# Patient Record
Sex: FEMALE | Race: WHITE | HISPANIC OR LATINO | Employment: FULL TIME | URBAN - METROPOLITAN AREA
[De-identification: names, ages, dates, MRNs, and addresses within clinical notes are randomized per-mention and may not be internally consistent; named-entity substitution may affect disease eponyms.]

---
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
## 2018-01-05 ENCOUNTER — NON-PROVIDER VISIT (OUTPATIENT)
Dept: OCCUPATIONAL MEDICINE | Facility: CLINIC | Age: 18
End: 2018-01-05

## 2018-01-05 DIAGNOSIS — Z02.1 PRE-EMPLOYMENT DRUG SCREENING: ICD-10-CM

## 2018-01-05 DIAGNOSIS — Z02.1 DRUG TESTING, PRE-EMPLOYMENT: ICD-10-CM

## 2018-01-05 LAB
AMP AMPHETAMINE: NORMAL
COC COCAINE: NORMAL
INT CON NEG: NORMAL
INT CON POS: NORMAL
MET METHAMPHETAMINES: NORMAL
OPI OPIATES: NORMAL
PCP PHENCYCLIDINE: NORMAL
POC DRUG COMMENT 753798-OCCUPATIONAL HEALTH: NEGATIVE
THC: NORMAL

## 2018-01-05 PROCEDURE — 80305 DRUG TEST PRSMV DIR OPT OBS: CPT | Performed by: PREVENTIVE MEDICINE

## 2018-12-14 ENCOUNTER — HOSPITAL ENCOUNTER (OUTPATIENT)
Facility: MEDICAL CENTER | Age: 18
End: 2018-12-14
Attending: OBSTETRICS & GYNECOLOGY | Admitting: OBSTETRICS & GYNECOLOGY
Payer: MEDICAID

## 2018-12-14 VITALS — HEART RATE: 73 BPM | SYSTOLIC BLOOD PRESSURE: 99 MMHG | DIASTOLIC BLOOD PRESSURE: 57 MMHG

## 2018-12-14 LAB
APPEARANCE UR: ABNORMAL
COLOR UR AUTO: ABNORMAL
GLUCOSE UR QL STRIP.AUTO: NEGATIVE MG/DL
KETONES UR QL STRIP.AUTO: NEGATIVE MG/DL
LEUKOCYTE ESTERASE UR QL STRIP.AUTO: NEGATIVE
NITRITE UR QL STRIP.AUTO: NEGATIVE
PH UR STRIP.AUTO: 5.5 [PH]
PROT UR QL STRIP: NEGATIVE MG/DL
RBC UR QL AUTO: NEGATIVE
SP GR UR: 1.02

## 2018-12-14 PROCEDURE — 81002 URINALYSIS NONAUTO W/O SCOPE: CPT

## 2018-12-14 NOTE — PROGRESS NOTES
1315 Pt presents with [place] from [facility] after having increased pelvic pressure and pink tinged discharge on toilet paper after using restroom. Pt denies recent sexual intercourse.    EDC 3/14/19 EGA 27. 1     RN attempted to put baby on the monitor. Pt reports good fetal movement. Ynable to find FHT with 250 US machine. Attempted to auscultate with doppler. Unable to find HR. Pulse ox applied to pt, maternal pulse in 60's. Auscultating in 60's. Charge RN Maldonado Salas called to bedside. US brought in. Dr. Becerra in department. Baby transverse, back up, FHT in high  upper left quadrent. 's. [place] applied, No UC's on monitor or palpated. T97.7, BP 99/57 73, SVE by Dr. Becerra, closed/thick/firm.   Clean catch urine dip, guillaume SG 1.025, all other values WNL. Report to Dr. Becerra, discharge order received. Pt to go home and rest and f/u with Dr. Escalante for her next scheduled appointment.   1415 Discharged to home, ambulatory.

## 2018-12-15 NOTE — CONSULTS
DATE OF SERVICE:  12/14/2018    OBSTETRICAL TRIAGE VISIT NOTE    DATE OF SERVICE:  12/14/2018    IDENTIFICATION:  This is a 18-year-old G2, P1, EDC 03/14/2019, which makes her   approximately 27 weeks' gestation.    HISTORY OF PRESENT ILLNESS:  The patient is a patient of Dr. Escalante, who has   received prenatal care since approximately 9 weeks gestation.  Pregnancy was   complicated by diagnosis of chlamydia at her first visit.  She had a negative   test of cure in September and 11/29.  She declined genetic testing and she is   Rh positive.  She presented today complaining of increasing pressure and a   small amount of pink spotting.  She was working out at [employer] today,   she has been on her feet for a long time and when she noted these symptoms and   was brought in by [facility].  Previous pregnancy, delivered at full term.  She   has no other medical history.  No other complaints.  She denies fevers or   leaking of fluid.  She reports that the baby has been moving well.    PHYSICAL EXAMINATION:  VITAL SIGNS:  She is afebrile, blood pressure was normotensive.  GENERAL:  She is pleasant, cooperative and appears her stated age.  ABDOMEN:  Soft.  GENITOURINARY:  Uterus is nontender.  Fetal heart tones initially could not be   found in all, I did a brief bedside ultrasound, baby was in transverse   position back up with the baby's head on the right side and fetal heart tones   were confirmed.  As of right now, she has a reactive fetal heart tracing that   appears to be appropriate for gestational age.  We are not picking up   contractions.  I checked her cervix, it is closed and firm.  I did not see any   blood on my glove.     PLAN:  Plan will be to send off her urinalysis to p.o., hydrate.  Assuming she   improves, we will send her home for followup with Dr. Escalante next week.       ____________________________________     MD STEFFEN Douglas / CARLINE    DD:  12/14/2018 13:43:59  DT:  12/14/2018 15:31:15    D#:   [id number]  Job#:  [id number]

## 2019-01-13 ENCOUNTER — HOSPITAL ENCOUNTER (OUTPATIENT)
Facility: MEDICAL CENTER | Age: 19
End: 2019-01-13
Attending: OBSTETRICS & GYNECOLOGY | Admitting: OBSTETRICS & GYNECOLOGY
Payer: MEDICAID

## 2019-01-13 VITALS
HEART RATE: 95 BPM | WEIGHT: 125 LBS | RESPIRATION RATE: 18 BRPM | HEIGHT: 61 IN | TEMPERATURE: 97.3 F | BODY MASS INDEX: 23.6 KG/M2 | SYSTOLIC BLOOD PRESSURE: 118 MMHG | DIASTOLIC BLOOD PRESSURE: 67 MMHG

## 2019-01-13 LAB
APPEARANCE UR: ABNORMAL
COLOR UR AUTO: YELLOW
FIBRONECTIN FETAL SPEC QL: NEGATIVE
GLUCOSE UR QL STRIP.AUTO: NEGATIVE MG/DL
KETONES UR QL STRIP.AUTO: 80 MG/DL
LEUKOCYTE ESTERASE UR QL STRIP.AUTO: ABNORMAL
NITRITE UR QL STRIP.AUTO: NEGATIVE
PH UR STRIP.AUTO: 7 [PH]
PROT UR QL STRIP: NEGATIVE MG/DL
RBC UR QL AUTO: NEGATIVE
SP GR UR: 1.02

## 2019-01-13 PROCEDURE — 700111 HCHG RX REV CODE 636 W/ 250 OVERRIDE (IP): Performed by: OBSTETRICS & GYNECOLOGY

## 2019-01-13 PROCEDURE — 82731 ASSAY OF FETAL FIBRONECTIN: CPT

## 2019-01-13 PROCEDURE — 96372 THER/PROPH/DIAG INJ SC/IM: CPT

## 2019-01-13 PROCEDURE — 59025 FETAL NON-STRESS TEST: CPT

## 2019-01-13 PROCEDURE — 81002 URINALYSIS NONAUTO W/O SCOPE: CPT

## 2019-01-13 RX ORDER — TERBUTALINE SULFATE 1 MG/ML
0.25 INJECTION, SOLUTION SUBCUTANEOUS ONCE
Status: COMPLETED | OUTPATIENT
Start: 2019-01-13 | End: 2019-01-13

## 2019-01-13 RX ORDER — TERBUTALINE SULFATE 1 MG/ML
INJECTION, SOLUTION SUBCUTANEOUS
Status: DISCONTINUED
Start: 2019-01-13 | End: 2019-01-13 | Stop reason: HOSPADM

## 2019-01-13 RX ADMIN — TERBUTALINE SULFATE 0.25 MG: 1 INJECTION, SOLUTION SUBCUTANEOUS at 18:43

## 2019-01-14 NOTE — H&P
DATE OF SERVICE:  2019    IDENTIFICATION:  This is a 18-year-old  2, para 1-0-0-1 with an EDC of   2019, EGA of 31 and 3/7 weeks who presents with a chief complaint of   uterine contractions.    HISTORY OF PRESENT ILLNESS:  This is a patient of Dr. Escalante.  She has gotten   good prenatal care.  She states her care has been uncomplicated.  She presents   today complaining of uterine contractions since 4:30 this afternoon.  She   denies spontaneous rupture of membranes or vaginal bleeding.  Admits to good   fetal movement.  Denies symptoms of PIH, headaches, visual changes or   epigastric pain.  She has no other complaints, nausea, vomiting, fever,   chills, change in bowel or bladder habits.  Fetal fibronectin and urinalysis   are negative.  Fetal heart tracings reactive, category 1.  She is marques   every 4-6 now.  Cervix is fingertip, thick, high, posterior per the nurse.    OBSTETRIC HISTORY:  Significant for previous vaginal delivery.    GYNECOLOGIC HISTORY:  History of chlamydia with treatment.    ALLERGIES:  None.    CURRENT MEDICATIONS:  Prenatal vitamins.    MEDICAL PROBLEMS:  None.    PAST SURGICAL HISTORY:  None.    SOCIAL HISTORY:  Denies alcohol, tobacco or drug use.    FAMILY HISTORY:  Noncontributory.    REVIEW OF SYSTEMS:  Review of systems x12 is negative per AMA standards   available in chart.    LABORATORY DATA:  FFN and urinalysis pending.    PHYSICAL EXAMINATION:  VITAL SIGNS:  Patient is afebrile.  Vital signs within normal limits.  Fetal   heart tracings reactive, category 1.  She is marques every 4-6, previously   more frequently.  GENERAL:  She is awake, alert, in no apparent distress.  NECK:  Supple.  HEART:  Regular.  CHEST:  Clear.  ABDOMEN:  Soft, gravid, size appropriate for dates.  EXTREMITIES:  Negative.  GYNECOLOGIC:  As above.    ASSESSMENT:  At this time is pregnancy at 31 and 3/7 weeks with    uterine contractions, rule out  labor.    PLAN:   At this time, we will do a urinalysis, check a FFN, hydrate, give her a   shot terb.  If all is well, we will send her home if she no signs or symptoms   of  labor.  Would follow up with Dr. Escalante this week.       ____________________________________     MD NORAH White / CARLINE    DD:  2019 18:41:37  DT:  2019 19:14:26    D#:  [id number]  Job#:  [id number]

## 2019-01-14 NOTE — PROGRESS NOTES
1900: report received from juanjo deleon rn, assumed care of patient.      1920: pt denies uc's after terbutaline.  urine sample obtained, +ketones and high specific gravity.  Pt given water, juice, and crackers.  Pt states she hasn't eaten much today. Pt educated    1955: dr. tomlinson in department. ffn negative.  Orders to discharge home.  Pt given pre term labor precautions and instructed to follow up with dr. tello this week.  Pt denies uc's.      2005: pt discharged in stable condition.

## 2019-01-14 NOTE — PROGRESS NOTES
1800 - Patient of of Dr. Escalante presents with c/o contractions. [place]t Gestation today at 31.3 weeks    Reports sudden onset of uterine contractions low in her abdomen wrapping to low back at 1630.  Denies problems with Pregnancy, denies ROM or Bleeding. Denies change to vision/edema/HA, Reports FM.  FM/TOCO use discussed and placed. POC discussed. Patient is unable to provide a urine sample, pitcher of ice/water encouraged/available at BS. Patient encouraged to call RN with all questions concerns needs prn.    Report to Dr. Deleon regarding patient arrival/complaint/status, order for FFN and SVE.   1815 - FFN collected, SVE at ft/thick/-3 no blood or fluid noted on exam glove.  1830 - Dr. Deleon at BS assessing complaint and discussing POC. Order received for terbutalline and continued oral hydration. Patient is still unable to offer a urine sample for ua.     1900 - Report to [place] CUNNINGHAM RN

## 2019-03-11 ENCOUNTER — HOSPITAL ENCOUNTER (INPATIENT)
Facility: MEDICAL CENTER | Age: 19
LOS: 2 days | End: 2019-03-13
Attending: OBSTETRICS & GYNECOLOGY | Admitting: OBSTETRICS & GYNECOLOGY
Payer: MEDICAID

## 2019-03-11 ENCOUNTER — APPOINTMENT (OUTPATIENT)
Dept: OBGYN | Facility: MEDICAL CENTER | Age: 19
End: 2019-03-11
Attending: OBSTETRICS & GYNECOLOGY
Payer: MEDICAID

## 2019-03-11 DIAGNOSIS — R10.2 PELVIC AND PERINEAL PAIN: ICD-10-CM

## 2019-03-11 LAB
BASOPHILS # BLD AUTO: 0.4 % (ref 0–1.8)
BASOPHILS # BLD: 0.03 K/UL (ref 0–0.12)
EOSINOPHIL # BLD AUTO: 0.1 K/UL (ref 0–0.51)
EOSINOPHIL NFR BLD: 1.3 % (ref 0–6.9)
ERYTHROCYTE [DISTWIDTH] IN BLOOD BY AUTOMATED COUNT: 41.2 FL (ref 35.9–50)
HCT VFR BLD AUTO: 34.7 % (ref 37–47)
HGB BLD-MCNC: 11.4 G/DL (ref 12–16)
HOLDING TUBE BB 8507: NORMAL
IMM GRANULOCYTES # BLD AUTO: 0.05 K/UL (ref 0–0.11)
IMM GRANULOCYTES NFR BLD AUTO: 0.6 % (ref 0–0.9)
LYMPHOCYTES # BLD AUTO: 2.54 K/UL (ref 1–4.8)
LYMPHOCYTES NFR BLD: 32.5 % (ref 22–41)
MCH RBC QN AUTO: 28.5 PG (ref 27–33)
MCHC RBC AUTO-ENTMCNC: 32.9 G/DL (ref 33.6–35)
MCV RBC AUTO: 86.8 FL (ref 81.4–97.8)
MONOCYTES # BLD AUTO: 0.43 K/UL (ref 0–0.85)
MONOCYTES NFR BLD AUTO: 5.5 % (ref 0–13.4)
NEUTROPHILS # BLD AUTO: 4.67 K/UL (ref 2–7.15)
NEUTROPHILS NFR BLD: 59.7 % (ref 44–72)
NRBC # BLD AUTO: 0 K/UL
NRBC BLD-RTO: 0 /100 WBC
PLATELET # BLD AUTO: 196 K/UL (ref 164–446)
PMV BLD AUTO: 9.6 FL (ref 9–12.9)
RBC # BLD AUTO: 4 M/UL (ref 4.2–5.4)
WBC # BLD AUTO: 7.8 K/UL (ref 4.8–10.8)

## 2019-03-11 PROCEDURE — 700102 HCHG RX REV CODE 250 W/ 637 OVERRIDE(OP): Performed by: OBSTETRICS & GYNECOLOGY

## 2019-03-11 PROCEDURE — 700112 HCHG RX REV CODE 229: Performed by: OBSTETRICS & GYNECOLOGY

## 2019-03-11 PROCEDURE — 85025 COMPLETE CBC W/AUTO DIFF WBC: CPT

## 2019-03-11 PROCEDURE — 700101 HCHG RX REV CODE 250

## 2019-03-11 PROCEDURE — 303615 HCHG EPIDURAL/SPINAL ANESTHESIA FOR LABOR

## 2019-03-11 PROCEDURE — 700105 HCHG RX REV CODE 258: Performed by: OBSTETRICS & GYNECOLOGY

## 2019-03-11 PROCEDURE — 770002 HCHG ROOM/CARE - OB PRIVATE (112)

## 2019-03-11 PROCEDURE — 700111 HCHG RX REV CODE 636 W/ 250 OVERRIDE (IP): Performed by: OBSTETRICS & GYNECOLOGY

## 2019-03-11 PROCEDURE — 304965 HCHG RECOVERY SERVICES

## 2019-03-11 PROCEDURE — 36415 COLL VENOUS BLD VENIPUNCTURE: CPT

## 2019-03-11 PROCEDURE — 10907ZC DRAINAGE OF AMNIOTIC FLUID, THERAPEUTIC FROM PRODUCTS OF CONCEPTION, VIA NATURAL OR ARTIFICIAL OPENING: ICD-10-PCS | Performed by: OBSTETRICS & GYNECOLOGY

## 2019-03-11 PROCEDURE — 3E033VJ INTRODUCTION OF OTHER HORMONE INTO PERIPHERAL VEIN, PERCUTANEOUS APPROACH: ICD-10-PCS | Performed by: OBSTETRICS & GYNECOLOGY

## 2019-03-11 PROCEDURE — 700111 HCHG RX REV CODE 636 W/ 250 OVERRIDE (IP)

## 2019-03-11 PROCEDURE — A9270 NON-COVERED ITEM OR SERVICE: HCPCS | Performed by: OBSTETRICS & GYNECOLOGY

## 2019-03-11 PROCEDURE — 59409 OBSTETRICAL CARE: CPT

## 2019-03-11 RX ORDER — CITRIC ACID/SODIUM CITRATE 334-500MG
30 SOLUTION, ORAL ORAL EVERY 6 HOURS PRN
Status: DISCONTINUED | OUTPATIENT
Start: 2019-03-11 | End: 2019-03-11 | Stop reason: HOSPADM

## 2019-03-11 RX ORDER — ROPIVACAINE HYDROCHLORIDE 2 MG/ML
INJECTION, SOLUTION EPIDURAL; INFILTRATION; PERINEURAL CONTINUOUS
Status: DISCONTINUED | OUTPATIENT
Start: 2019-03-11 | End: 2019-03-13 | Stop reason: HOSPADM

## 2019-03-11 RX ORDER — ONDANSETRON 2 MG/ML
4 INJECTION INTRAMUSCULAR; INTRAVENOUS EVERY 6 HOURS PRN
Status: DISCONTINUED | OUTPATIENT
Start: 2019-03-11 | End: 2019-03-13 | Stop reason: HOSPADM

## 2019-03-11 RX ORDER — DEXTROSE, SODIUM CHLORIDE, SODIUM LACTATE, POTASSIUM CHLORIDE, AND CALCIUM CHLORIDE 5; .6; .31; .03; .02 G/100ML; G/100ML; G/100ML; G/100ML; G/100ML
INJECTION, SOLUTION INTRAVENOUS CONTINUOUS
Status: DISCONTINUED | OUTPATIENT
Start: 2019-03-11 | End: 2019-03-13 | Stop reason: HOSPADM

## 2019-03-11 RX ORDER — ACETAMINOPHEN 325 MG/1
325 TABLET ORAL EVERY 4 HOURS PRN
Status: DISCONTINUED | OUTPATIENT
Start: 2019-03-11 | End: 2019-03-13 | Stop reason: HOSPADM

## 2019-03-11 RX ORDER — MISOPROSTOL 200 UG/1
800 TABLET ORAL
Status: DISCONTINUED | OUTPATIENT
Start: 2019-03-11 | End: 2019-03-13 | Stop reason: HOSPADM

## 2019-03-11 RX ORDER — IBUPROFEN 600 MG/1
600 TABLET ORAL EVERY 6 HOURS PRN
Status: DISCONTINUED | OUTPATIENT
Start: 2019-03-11 | End: 2019-03-13 | Stop reason: HOSPADM

## 2019-03-11 RX ORDER — ROPIVACAINE HYDROCHLORIDE 2 MG/ML
INJECTION, SOLUTION EPIDURAL; INFILTRATION; PERINEURAL
Status: COMPLETED
Start: 2019-03-11 | End: 2019-03-11

## 2019-03-11 RX ORDER — SODIUM CHLORIDE, SODIUM LACTATE, POTASSIUM CHLORIDE, AND CALCIUM CHLORIDE .6; .31; .03; .02 G/100ML; G/100ML; G/100ML; G/100ML
1000 INJECTION, SOLUTION INTRAVENOUS
Status: DISCONTINUED | OUTPATIENT
Start: 2019-03-11 | End: 2019-03-11 | Stop reason: HOSPADM

## 2019-03-11 RX ORDER — SODIUM CHLORIDE, SODIUM LACTATE, POTASSIUM CHLORIDE, AND CALCIUM CHLORIDE .6; .31; .03; .02 G/100ML; G/100ML; G/100ML; G/100ML
250 INJECTION, SOLUTION INTRAVENOUS PRN
Status: DISCONTINUED | OUTPATIENT
Start: 2019-03-11 | End: 2019-03-11 | Stop reason: HOSPADM

## 2019-03-11 RX ORDER — HYDROCODONE BITARTRATE AND ACETAMINOPHEN 10; 325 MG/1; MG/1
1 TABLET ORAL EVERY 4 HOURS PRN
Status: DISCONTINUED | OUTPATIENT
Start: 2019-03-11 | End: 2019-03-13 | Stop reason: HOSPADM

## 2019-03-11 RX ORDER — BISACODYL 10 MG
10 SUPPOSITORY, RECTAL RECTAL PRN
Status: DISCONTINUED | OUTPATIENT
Start: 2019-03-11 | End: 2019-03-13 | Stop reason: HOSPADM

## 2019-03-11 RX ORDER — HYDROCODONE BITARTRATE AND ACETAMINOPHEN 5; 325 MG/1; MG/1
1 TABLET ORAL EVERY 4 HOURS PRN
Status: DISCONTINUED | OUTPATIENT
Start: 2019-03-11 | End: 2019-03-13 | Stop reason: HOSPADM

## 2019-03-11 RX ORDER — ONDANSETRON 4 MG/1
4 TABLET, ORALLY DISINTEGRATING ORAL EVERY 6 HOURS PRN
Status: DISCONTINUED | OUTPATIENT
Start: 2019-03-11 | End: 2019-03-13 | Stop reason: HOSPADM

## 2019-03-11 RX ORDER — SODIUM CHLORIDE, SODIUM LACTATE, POTASSIUM CHLORIDE, CALCIUM CHLORIDE 600; 310; 30; 20 MG/100ML; MG/100ML; MG/100ML; MG/100ML
INJECTION, SOLUTION INTRAVENOUS PRN
Status: DISCONTINUED | OUTPATIENT
Start: 2019-03-11 | End: 2019-03-13 | Stop reason: HOSPADM

## 2019-03-11 RX ORDER — SODIUM CHLORIDE, SODIUM LACTATE, POTASSIUM CHLORIDE, CALCIUM CHLORIDE 600; 310; 30; 20 MG/100ML; MG/100ML; MG/100ML; MG/100ML
INJECTION, SOLUTION INTRAVENOUS CONTINUOUS
Status: DISPENSED | OUTPATIENT
Start: 2019-03-11 | End: 2019-03-11

## 2019-03-11 RX ORDER — MISOPROSTOL 200 UG/1
800 TABLET ORAL
Status: DISCONTINUED | OUTPATIENT
Start: 2019-03-11 | End: 2019-03-11 | Stop reason: HOSPADM

## 2019-03-11 RX ORDER — DOCUSATE SODIUM 100 MG/1
100 CAPSULE, LIQUID FILLED ORAL 2 TIMES DAILY PRN
Status: DISCONTINUED | OUTPATIENT
Start: 2019-03-11 | End: 2019-03-13 | Stop reason: HOSPADM

## 2019-03-11 RX ORDER — VITAMIN A ACETATE, BETA CAROTENE, ASCORBIC ACID, CHOLECALCIFEROL, .ALPHA.-TOCOPHEROL ACETATE, DL-, THIAMINE MONONITRATE, RIBOFLAVIN, NIACINAMIDE, PYRIDOXINE HYDROCHLORIDE, FOLIC ACID, CYANOCOBALAMIN, CALCIUM CARBONATE, FERROUS FUMARATE, ZINC OXIDE, CUPRIC OXIDE 3080; 12; 120; 400; 1; 1.84; 3; 20; 22; 920; 25; 200; 27; 10; 2 [IU]/1; UG/1; MG/1; [IU]/1; MG/1; MG/1; MG/1; MG/1; MG/1; [IU]/1; MG/1; MG/1; MG/1; MG/1; MG/1
1 TABLET, FILM COATED ORAL EVERY MORNING
Status: DISCONTINUED | OUTPATIENT
Start: 2019-03-12 | End: 2019-03-13 | Stop reason: HOSPADM

## 2019-03-11 RX ADMIN — Medication 125 ML/HR: at 15:00

## 2019-03-11 RX ADMIN — SODIUM CHLORIDE, POTASSIUM CHLORIDE, SODIUM LACTATE AND CALCIUM CHLORIDE: 600; 310; 30; 20 INJECTION, SOLUTION INTRAVENOUS at 06:43

## 2019-03-11 RX ADMIN — IBUPROFEN 600 MG: 600 TABLET ORAL at 22:12

## 2019-03-11 RX ADMIN — SODIUM CHLORIDE 5 MILLION UNITS: 900 INJECTION INTRAVENOUS at 06:45

## 2019-03-11 RX ADMIN — SODIUM CHLORIDE 2.5 MILLION UNITS: 9 INJECTION, SOLUTION INTRAVENOUS at 10:33

## 2019-03-11 RX ADMIN — HYDROCODONE BITARTRATE AND ACETAMINOPHEN 1 TABLET: 5; 325 TABLET ORAL at 22:44

## 2019-03-11 RX ADMIN — Medication 2 MILLI-UNITS/MIN: at 06:55

## 2019-03-11 RX ADMIN — DOCUSATE SODIUM 100 MG: 100 CAPSULE, LIQUID FILLED ORAL at 22:12

## 2019-03-11 RX ADMIN — ROPIVACAINE HYDROCHLORIDE 100 ML: 2 INJECTION, SOLUTION EPIDURAL; INFILTRATION at 11:38

## 2019-03-11 RX ADMIN — IBUPROFEN 600 MG: 600 TABLET ORAL at 16:05

## 2019-03-11 RX ADMIN — HYDROCODONE BITARTRATE AND ACETAMINOPHEN 1 TABLET: 10; 325 TABLET ORAL at 18:45

## 2019-03-11 RX ADMIN — SODIUM CHLORIDE, POTASSIUM CHLORIDE, SODIUM LACTATE AND CALCIUM CHLORIDE: 600; 310; 30; 20 INJECTION, SOLUTION INTRAVENOUS at 11:05

## 2019-03-11 ASSESSMENT — EDINBURGH POSTNATAL DEPRESSION SCALE (EPDS)
THINGS HAVE BEEN GETTING ON TOP OF ME: NO, MOST OF THE TIME I HAVE COPED QUITE WELL
I HAVE FELT SCARED OR PANICKY FOR NO GOOD REASON: NO, NOT AT ALL
THE THOUGHT OF HARMING MYSELF HAS OCCURRED TO ME: NEVER
I HAVE BEEN SO UNHAPPY THAT I HAVE HAD DIFFICULTY SLEEPING: NOT AT ALL
I HAVE BLAMED MYSELF UNNECESSARILY WHEN THINGS WENT WRONG: NOT VERY OFTEN
I HAVE BEEN ABLE TO LAUGH AND SEE THE FUNNY SIDE OF THINGS: AS MUCH AS I ALWAYS COULD
I HAVE BEEN SO UNHAPPY THAT I HAVE BEEN CRYING: NO, NEVER
I HAVE FELT SAD OR MISERABLE: NO, NOT AT ALL
I HAVE BEEN ANXIOUS OR WORRIED FOR NO GOOD REASON: NO, NOT AT ALL
I HAVE LOOKED FORWARD WITH ENJOYMENT TO THINGS: AS MUCH AS I EVER DID

## 2019-03-11 ASSESSMENT — PATIENT HEALTH QUESTIONNAIRE - PHQ9
SUM OF ALL RESPONSES TO PHQ9 QUESTIONS 1 AND 2: 0
1. LITTLE INTEREST OR PLEASURE IN DOING THINGS: NOT AT ALL
2. FEELING DOWN, DEPRESSED, IRRITABLE, OR HOPELESS: NOT AT ALL

## 2019-03-11 ASSESSMENT — LIFESTYLE VARIABLES
ALCOHOL_USE: NO
EVER_SMOKED: NEVER

## 2019-03-11 NOTE — PROGRESS NOTES
Complete, 1+  Cat I currently, had some variables 30-45min ago  Will start pushing  Rodríguez Escalante MD, MS,  3/11/2019, 2:01 PM

## 2019-03-11 NOTE — H&P
"Obstetrics and Gynecology  Labor and Delivery History and Physical    Date of Admission: 3/11/2019      ID: 18 y.o.  with IUP at 39w4d     Primary OB: Rodríguez Escalante M.D.    Attending OB: Rodríguez Escalante M.D.    CC: elective IOL    HPI: Evelyn Priest is a 18 y.o.  at 39w4d by 8 week ultrasound, who presents for elective IOL.  Pt feeling well.  Denies LOF, VB.  +FM.  Intermittent CTX.  No other c/o.  Current pregnancy has been complicated by short interval pregnancy and chlamydia at the beginning of pregnancy.  Otherwise uncomplicated.      ROS: 10 systems reviewed and negative except as noted above.    Obstetric History    - 8/10/2017, , 40wk, male, 6lb 14oz, no complications   G2 - Current, as noted in HPI      Past Medical History  Surgical History   Anemia none      Gynecologic History  Social History   Menses had not yet returned prior to pregnancy after removal of Nexplanon  Has not has a pap smear yet.  +Hx of STIs: chlamydia at the beginning of pregnancy, GABRIEL negative Tobacco: denies  EtOH: denies  Street Drugs: denies      Medications  Allergies   Fe No Known Allergies     Family History   Hypertension, hyperlipidemia, diabetes       O: /59   Pulse 65   Temp 36.3 °C (97.3 °F) (Temporal)   Ht 1.549 m (5' 1\")   Wt 59.9 kg (132 lb)   BMI 24.94 kg/m²       Gen: NAD, AAO  Resp: unlabored  Abd: Gravid, NTTP,Cephalic by [place], No rebound or guarding  Ext: NTTP, no edema, 2+DPP  Pelvic: SVE 3/90/-2    FHT:  145/mod nargis/+accels, -decels  [place]: CTX q2-3min    Labs:   Lab Results   Component Value Date/Time    WBC 7.8 2019 06:35 AM    RBC 4.00 (L) 2019 06:35 AM    HEMOGLOBIN 11.4 (L) 2019 06:35 AM    HEMATOCRIT 34.7 (L) 2019 06:35 AM    MCV 86.8 2019 06:35 AM    RDW 41.2 2019 06:35 AM    PLATELETCT 196 2019 06:35 AM       Prenatal labs:   Lab  Result    Rh  positive    Antibody screen  negative    Rubella  Immune    HIV "  Non-reactive    RPR  Non-reactive    HBsAg  negative    Varicella  NONIMMUNE    Urine Culture  enterococcus at NOB visit, GABRIEL and 3rd tri negative    Gonorrhea/chlamydia  negative/positive chlamydia (negative GABRIEL and 3rd trimester screen)    Aneuploidy screening  declined    1h Glucose  108    GBS  POSITIVE       A/P: Evelyn Priest is a  at 39w4d by 8wk U/S who presents for elective IOL.  AVSS.  Cat I FHT.  *Admit to L&D  *IV, CBC, T&S on hold  *Elective Induction of Labor: On oxytocin.  Now s/p AROM.     - Recheck cx in 2h or as clinically indicated.   - Oxytocin per protocol.  *FWB: Reassuring, reactive, Cat I FHT.     - CEFM.  *Pain: planning to go without epidural  *GBS positive: PCN PPX  *Global: Rh+, RubImm, VarNONIMM   - Varivax PP   - Clears    Rodríguez Escalante MD, MS,  3/11/2019, 10:12 AM

## 2019-03-11 NOTE — PROGRESS NOTES
0600_ Pt here for elective induction. Pt is   CHRISTIAN 3/14/2019, making her 39.4 weeks GA.  VSS. EFM and TOCO applied.   0610_ SVE 2/60/-3  0700_ Report to SERA Castellanos RN.

## 2019-03-11 NOTE — PROGRESS NOTES
0700-Received BS report from Kevin RN, pt care assumed.  1005-AROM per Dr. Escalante-gricelda, 3/90/-2.  1030-Pt requests epidural.  1120-Dr. Werner in room to place epidural.  1205-SVE=7-8/90/0.  1310-SVE=ant lip/0-Dr. Escalante notified.  1355-SVE=Complete.  1414-Delivery of viable female-8/9 apgars.  1417-Placenta delivered-pitocin running

## 2019-03-12 LAB
ERYTHROCYTE [DISTWIDTH] IN BLOOD BY AUTOMATED COUNT: 41.3 FL (ref 35.9–50)
HCT VFR BLD AUTO: 27.7 % (ref 37–47)
HGB BLD-MCNC: 9.2 G/DL (ref 12–16)
MCH RBC QN AUTO: 29.5 PG (ref 27–33)
MCHC RBC AUTO-ENTMCNC: 34 G/DL (ref 33.6–35)
MCV RBC AUTO: 87 FL (ref 81.4–97.8)
PLATELET # BLD AUTO: 160 K/UL (ref 164–446)
PMV BLD AUTO: 9.6 FL (ref 9–12.9)
RBC # BLD AUTO: 3.08 M/UL (ref 4.2–5.4)
WBC # BLD AUTO: 11.3 K/UL (ref 4.8–10.8)

## 2019-03-12 PROCEDURE — 36415 COLL VENOUS BLD VENIPUNCTURE: CPT

## 2019-03-12 PROCEDURE — 85027 COMPLETE CBC AUTOMATED: CPT

## 2019-03-12 PROCEDURE — A9270 NON-COVERED ITEM OR SERVICE: HCPCS | Performed by: OBSTETRICS & GYNECOLOGY

## 2019-03-12 PROCEDURE — 770002 HCHG ROOM/CARE - OB PRIVATE (112)

## 2019-03-12 PROCEDURE — 700102 HCHG RX REV CODE 250 W/ 637 OVERRIDE(OP): Performed by: OBSTETRICS & GYNECOLOGY

## 2019-03-12 RX ADMIN — IBUPROFEN 600 MG: 600 TABLET ORAL at 21:57

## 2019-03-12 RX ADMIN — HYDROCODONE BITARTRATE AND ACETAMINOPHEN 1 TABLET: 5; 325 TABLET ORAL at 21:57

## 2019-03-12 RX ADMIN — Medication 1 TABLET: at 08:08

## 2019-03-12 RX ADMIN — IBUPROFEN 600 MG: 600 TABLET ORAL at 12:54

## 2019-03-12 NOTE — LACTATION NOTE
[facility] reports she has not  since baby returned from nursery. With permission, baby unwrapped and placed skin to skin with mother. Reviewed hunger cues and encouraged to call for latch assessment when hunger cues noted. Encouraged to keep skin to skin at least 2 hours.

## 2019-03-12 NOTE — CARE PLAN
Problem: Altered physiologic condition related to immediate post-delivery state and potential for bleeding/hemorrhage  Goal: Patient physiologically stable as evidenced by normal lochia, palpable uterine involution and vital signs within normal limits  Outcome: PROGRESSING AS EXPECTED  VSS. Fundus firm with scant lochia. Patient educated on when to pull emergency call light.     Problem: Alteration in comfort related to episiotomy, vaginal repair and/or after birth pains  Goal: Patient verbalizes acceptable pain level  Outcome: PROGRESSING AS EXPECTED  Patient will call when needing pain medication. States adequate pain relief with medications given.

## 2019-03-12 NOTE — PROGRESS NOTES
Obstetrics and Gynecology  Postpartum Progress Note    CC: PPD1 s/p     S: Pt feeling well.  Pain well controlled.  Gary reg diet.  Has ambulated.  Lochia mild. Voiding w/o difficulty.  +flatus/-BM.  Breastfeeding.  Pt denies CP/SOB, N/V, constipation/diarrhea, lower leg pain.      O:   Vitals:    19 1740 19 2200 19 0200 19 0600   BP: 112/71 110/74 116/69 105/61   Pulse: 72 71 (!) 56 73   Resp: 16 18 18 16   Temp: 36.8 °C (98.2 °F) 36.7 °C (98 °F) 36.6 °C (97.9 °F) 36.4 °C (97.5 °F)   TempSrc: Temporal Oral Oral Oral   SpO2: 98% 96% 96% 96%   Weight:       Height:          Temp (24hrs), Av.6 °C (97.9 °F), Min:36.4 °C (97.5 °F), Max:36.8 °C (98.2 °F)       Gen: NAD, AAO    CV: RRR    Pulm: unlabored    Abd: Soft, NT, no rebound/guarding, fundus firm at U-1.    Ext: WWP, no edema, non-tender to palpation    CBC:   3/11/2019 06:35 3/12/2019 02:14   WBC 7.8 11.3 (H)   RBC 4.00 (L) 3.08 (L)   Hemoglobin 11.4 (L) 9.2 (L)   Hematocrit 34.7 (L) 27.7 (L)   MCV 86.8 87.0   MCH 28.5 29.5   MCHC 32.9 (L) 34.0   RDW 41.2 41.3   Platelet Count 196 160 (L)       A/P: Evelyn Priest is a 18 y.o.  postpartum s/p .  AVSS.  Recovering well.    - Continue routine postpartum care.    - Encourage ambulation.    - Continue current pain regimen    - Rh +.  Rhogam NOT indicated.    - Rubella imm.  Varicella NONIMM, varivax PP.    Anticipate d/c tomorrow given GBS positive and temp regulation in infant    Rodríguez Escalante MD, MS,  3/12/2019, 9:15 AM

## 2019-03-12 NOTE — PROGRESS NOTES
Received bedside report from Leti JORGE RN. Patient in bed, declines pain at this time. Patient will call when needing pain medication. IV patent running second bag of pitocin at 125 cc/hr. POC discussed. Whiteboards updated. Call light within reach. Patient encouraged to call with any needs and or concerns.

## 2019-03-12 NOTE — PROGRESS NOTES
1725- Patient arrived to Room S321.  Report received from EMILIANO Vázquez RN.    1730- Patient assessment done.  IV patent, infusing LR with 20 units pitocin at 125 ml/hr.  Discussed pain management with patient.  Patient prefers to call for pain medication as needed.  Patient oriented to room, call system, and infant security.  Reviewed plan of care.  Patient verbalized understanding.  Visitor at bedside.

## 2019-03-12 NOTE — CONSULTS
Initial visit. MOB has BF experience, BF her son x 7 months. States infant has been latching, and denies any pain or soreness with latch. Reviewed normal  behaviors and normal course of breastfeeding at 12-24-48-72 hours, and what to expect. Discussed importance of offering breast with feeding cues or at least every 2-3 hours.  She states she can independently hand express colostrum, and declines assistance at this time.   Getting to Know your Baby information given. Says she doesn't want WIC. Information and phone number to the Lactation Connection provided, and invited to breastfeeding circles.    DEDE verbalized understanding of information provided, had no other questions or concerns at this time, encouraged to call for support as needed.

## 2019-03-12 NOTE — PROGRESS NOTES
4101- Report received from MARGARITA Schulz.  Patient sleeping at this time.  4670- Patient assessment done.  Patient stated that she is voiding without difficulty and passing flatus.  Patient denied dizziness and stated that she is walking without difficulty.  Discussed pain management plan and patient prefers to call for pain intervention as needed.  Reviewed plan of care.  Patient verbalized understanding.

## 2019-03-12 NOTE — LACTATION NOTE
followup visit, MOB reports baby just finished breastfeeding.She states she did not have nipple pain with this last feeding, and states her nipples are intact. Declines having me assess nipples at this time.Encouraged to call for latch assessment with next feeding.

## 2019-03-12 NOTE — LACTATION NOTE
Initial visit. Baby in nursery. MOB reports that baby has  numerous times but she feels the latch is shallow. Nipples intact bilaterally. Encouraged to call for latch assessment with next feeding. Encouraged skin to skin time when baby returns from nursery and to delay bath until breastfeeding well, if possible. Reviewed hunger cues, feeding on cue or at least 8 times every 24 hours, feeding without time limits and skin to skin care when MOB is awake.

## 2019-03-13 VITALS
WEIGHT: 132 LBS | HEART RATE: 62 BPM | TEMPERATURE: 97.2 F | DIASTOLIC BLOOD PRESSURE: 67 MMHG | SYSTOLIC BLOOD PRESSURE: 97 MMHG | OXYGEN SATURATION: 97 % | HEIGHT: 61 IN | RESPIRATION RATE: 17 BRPM | BODY MASS INDEX: 24.92 KG/M2

## 2019-03-13 PROCEDURE — 700102 HCHG RX REV CODE 250 W/ 637 OVERRIDE(OP): Performed by: OBSTETRICS & GYNECOLOGY

## 2019-03-13 PROCEDURE — A9270 NON-COVERED ITEM OR SERVICE: HCPCS | Performed by: OBSTETRICS & GYNECOLOGY

## 2019-03-13 RX ORDER — HYDROCODONE BITARTRATE AND ACETAMINOPHEN 5; 325 MG/1; MG/1
1 TABLET ORAL EVERY 4 HOURS PRN
Qty: 15 TAB | Refills: 0 | Status: SHIPPED | OUTPATIENT
Start: 2019-03-13 | End: 2019-03-20

## 2019-03-13 RX ORDER — IBUPROFEN 600 MG/1
600 TABLET ORAL EVERY 6 HOURS PRN
Qty: 30 TAB | Refills: 2 | Status: SHIPPED | OUTPATIENT
Start: 2019-03-13 | End: 2020-02-02

## 2019-03-13 RX ADMIN — Medication 1 TABLET: at 09:09

## 2019-03-13 NOTE — DISCHARGE SUMMARY
DATE OF DISCHARGE:  2019    DIAGNOSES:  Term pregnancy, delivered.    PROCEDURE:  Spontaneous vaginal delivery.    HISTORY OF PRESENT ILLNESS:  The patient is a 18-year-old  2, para 1   female at 39-1/2 weeks' gestation who was admitted for elective induction of   labor.  She was admitted on  and underwent delivery on that day.  She   remained in the hospital for 2 postpartum days without complication.  Her   postpartum hematocrit was 27.7.  She was discharged home in good condition   with prescription of Motrin and Percocet and will follow up in the office in 6   weeks for routine visit with Dr. Escalante.       ____________________________________     MD MAGALIS SANDERS / CARLINE    DD:  2019 07:31:21  DT:  2019 12:13:33    D#:  [id number]  Job#:  [id number]

## 2019-03-13 NOTE — DISCHARGE INSTRUCTIONS
POSTPARTUM DISCHARGE INSTRUCTIONS FOR MOM    YOB: 2000   Age: 18 y.o.               Admit Date: 3/11/2019     Discharge Date: 3/13/2019  Attending Doctor:  Rodríguez Escalante M.D.                  Allergies:  Patient has no known allergies.    Discharged to home by car. Discharged via wheelchair, hospital escort: Yes.  Special equipment needed: Not Applicable  Belongings with: Personal  Be sure to schedule a follow-up appointment with your primary care doctor or any specialists as instructed.     Discharge Plan:   Influenza Vaccine Indication: Not indicated: Previously immunized this influenza season and > 8 years of age    REASONS TO CALL YOUR OBSTETRICIAN:  1.   Persistent fever or shaking chills (Temperature higher than 100.4)  2.   Heavy bleeding (soaking more than 1 pad per hour); Passing clots  3.   Foul odor from vagina  4.   Mastitis (Breast infection; breast pain, chills, fever, redness)  5.   Urinary pain, burning or frequency  6.   Episiotomy infection  7.   Abdominal incision infection  8.   Severe depression longer than 24 hours    HAND WASHING  · Prior to handling the baby.  · Before breastfeeding or bottle feeding baby.  · After using the bathroom or changing the baby's diaper.    WOUND CARE  Ask your physician for additional care instructions.  In general:    ·  Incision:      · Keep clean and dry.    · Do NOT lift anything heavier than your baby for up to 6 weeks.    · There should not be any opening or pus.      VAGINAL CARE  · Nothing inside vagina for 6 weeks: no sexual intercourse, tampons or douching.  · Bleeding may continue for 2-4 weeks.  Amount may vary.    · Call your physician for heavy bleeding which means soaking more than 1 pad per hour    BIRTH CONTROL  · It is possible to become pregnant at any time after delivery and while breastfeeding.  · Plan to discuss a method of birth control with your physician at your follow up visit. visit.    DIET AND  "ELIMINATION  · Eating more fiber (bran cereal, fruits, and vegetables) and drinking plenty of fluids will help to avoid constipation.  · Urinary frequency after childbirth is normal.    POSTPARTUM BLUES  During the first few days after birth, you may experience a sense of the \"blues\" which may include impatience, irritability or even crying.  These feeling come and go quickly.  However, as many as 1 in 10 women experience emotional symptoms known as postpartum depression.    Postpartum depression:  May start as early as the second or third day after delivery or take several weeks or months to develop.  Symptoms of \"blues\" are present, but are more intense:  Crying spells; loss of appetite; feelings of hopelessness or loss of control; fear of touching the baby; over concern or no concern at all about the baby; little or no concern about your own appearance/caring for yourself; and/or inability to sleep or excessive sleeping.  Contact your physician if you are experiencing any of these symptoms.    Crisis Hotline:  · [place] Crisis Hotline:  1-192-JRTSTEG  Or 1-713.916.8420  · [place] Crisis Hotline:  1-515.255.2656  Or 930-669-6307    PREVENTING SHAKEN BABY:  If you are angry or stressed, PUT THE BABY IN THE CRIB, step away, take some deep breaths, and wait until you are calm to care for the baby.  DO NOT SHAKE THE BABY.  You are not alone, call a supporter for help.    · Crisis Call Center 24/7 crisis line 585-798-6674 or 1-466.333.9055  · You can also text them, text \"ANSWER\" to 011813    QUIT SMOKING/TOBACCO USE:  I understand the use of any tobacco products increases my chance of suffering from future heart disease and could cause other illnesses which may shorten my life. Quitting the use of tobacco products is the single most important thing I can do to improve my health. For further information on smoking / tobacco cessation call a Toll Free Quit Line at 1-214.888.7720 (*National [employer] [place]) or " 1-591.124.8896 (American Lung Association) or you can access the web based program at www.lungusa.org.    · [place] Tobacco Users Help Line:  (939) 879-3919       Toll Free: 1-398.757.8962  · Quit Tobacco Program [facility] (594)167-9171    DEPRESSION / SUICIDE RISK:  As you are discharged from this [facility], it is important to learn how to keep safe from harming yourself.    Recognize the warning signs:  · Abrupt changes in personality, positive or negative- including increase in energy   · Giving away possessions  · Change in eating patterns- significant weight changes-  positive or negative  · Change in sleeping patterns- unable to sleep or sleeping all the time   · Unwillingness or inability to communicate  · Depression  · Unusual sadness, discouragement and loneliness  · Talk of wanting to die  · Neglect of personal appearance   · Rebelliousness- reckless behavior  · Withdrawal from people/activities they love  · Confusion- inability to concentrate     If you or a loved one observes any of these behaviors or has concerns about self-harm, here's what you can do:  · Talk about it- your feelings and reasons for harming yourself  · Remove any means that you might use to hurt yourself (examples: pills, rope, extension cords, firearm)  · Get professional help from the community (Mental Health, Substance Abuse, psychological counseling)  · Do not be alone:Call your Safe Contact- someone whom you trust who will be there for you.  · Call your local CRISIS HOTLINE 478-1137 or 075-799-4810  · Call your local Children's Mobile Crisis Response Team [place] (078) 597-7995 or www.ThingMagic  · Call the toll free National Suicide Prevention Hotlines   · National Suicide Prevention Lifeline 343-606-PMMZ (4007)  · National Hope Line Network 800-SUICIDE (099-1031)    DISCHARGE SURVEY:  Thank you for choosing [facility].  We hope we provided you with very good care.  You may be  receiving a survey in the mail.  Please fill it out.  Your opinion is valuable to us.    ADDITIONAL EDUCATIONAL MATERIALS GIVEN TO PATIENT:        My signature on this form indicates that:  1.  I have reviewed and understand the above information  2.  My questions regarding this information have been answered to my satisfaction.  3.  I have formulated a plan with my discharge nurse to obtain my prescribed medication for home.

## 2019-03-13 NOTE — PROGRESS NOTES
PP#2    Pt feels well and is ready for home    VS afebrile  abd fundus firm and NT  Gyn light flow  Hct 27.7    Stable PP    Home, rx motrin and percoset, instructions reviewed  Follow up in 6 week

## 2019-03-13 NOTE — L&D DELIVERY NOTE
DATE OF SERVICE:  03/11/2019    PRIMARY AND DELIVERING OBSTETRICIAN:  Rodríguez Escalante MD    PROCEDURE:  Normal spontaneous vaginal delivery.    PREPROCEDURE DIAGNOSES:  1.  A 18-year-old G2, P1-0-0-1 with intrauterine pregnancy at 39 weeks 4 days   gestational age by 8-week ultrasound.  2.  Elective induction of labor.  3.  Uncomplicated pregnancy other than short interval pregnancy.  4.  History of chlamydia during this pregnancy.    POSTPROCEDURE DIAGNOSES:  1.  A 18-year-old G2, P1-0-0-1 with intrauterine pregnancy at 39 weeks 4 days   gestational age by 8-week ultrasound.  2.  Elective induction of labor.  3.  Uncomplicated pregnancy other than short interval pregnancy.  4.  History of chlamydia during this pregnancy.  5.  Postpartum, status post normal spontaneous vaginal delivery.    TYPE OF ANESTHESIA:  Epidural.    ANESTHESIOLOGIST:  Paula Werner MD    FINDINGS:  1.  Viable female infant in direct OA position delivered at 1414 on   03/11/2019.  2.  Weight 3230 g (7 pounds 1.9 ounces).  3.  Apgars 8 at one minute and 9 at five minutes.  4.  Intact, normal-appearing placenta with 3-vessel cord and central cord   insertion.  5.  Clear amniotic fluid.  6.  No obstetric lacerations.    ESTIMATED BLOOD LOSS:  300 mL.    NARRATIVE:  This 18-year-old G2, P1-0-0-1 with intrauterine pregnancy at 39   weeks 4 days gestational age, presented to labor and delivery for elective   induction of labor.  She was started on oxytocin and underwent artificial   rupture of membranes.  She progressed along a normal labor curve to complete   dilation.  She then pushed over 2 contractions until delivery of the fetal   head, which occurred atraumatically.  The head was guided out gently without   traction.  The anterior and then posterior shoulders delivered quickly   thereafter and easily.  The infant was immediately placed on the patient's   abdomen and warmed and dried by the awaiting baby nurse.  The nose and mouth   were  suctioned with the bulb suction.  As the infant was noted to be   immediately vigorous, moving all extremities equally, we awaited cessation of   cord pulsation before the cord was doubly clamped and cut.  The placenta   delivered simply and quickly thereafter with the aid of active management of   third stage of labor with application of oxytocin at postpartum dosing and   gentle traction on the cord.  Survey of the patient's perineum, vulva and   vagina revealed the above findings.  No obstetrical lacerations were noted.    The patient tolerated the procedure well.  There were no complications.    Sponge and needle counts were correct at the end of procedure.  The patient   and her infant remained in her birthing room before later transfer to   maternity.    COMPLICATIONS:  None.    CONDITION:  Good.    DISPOSITION:  Birthing room and then maternity.       ____________________________________     MD BALTAZAR Bruner / NTS    DD:  03/12/2019 20:47:36  DT:  03/13/2019 05:04:05    D#:  [id number]  Job#:  [id number]

## 2019-03-13 NOTE — CARE PLAN
Problem: Altered physiologic condition related to immediate post-delivery state and potential for bleeding/hemorrhage  Goal: Patient physiologically stable as evidenced by normal lochia, palpable uterine involution and vital signs within normal limits  Outcome: PROGRESSING AS EXPECTED  Lochia light, fundus firm, vital signs WNL    Problem: Alteration in comfort related to episiotomy, vaginal repair and/or after birth pains  Goal: Patient is able to ambulate, care for self and infant  Outcome: PROGRESSING AS EXPECTED  Patient will request pain medication as needed

## 2019-03-13 NOTE — PROGRESS NOTES
Pt states understanding of all discharge info and follow up appts. Pt bonding with infant. Pt discharged to home with infant and family friends.

## 2019-12-10 ENCOUNTER — HOSPITAL ENCOUNTER (EMERGENCY)
Facility: MEDICAL CENTER | Age: 19
End: 2019-12-10
Attending: EMERGENCY MEDICINE
Payer: MEDICAID

## 2019-12-10 VITALS
DIASTOLIC BLOOD PRESSURE: 65 MMHG | BODY MASS INDEX: 24.85 KG/M2 | SYSTOLIC BLOOD PRESSURE: 110 MMHG | OXYGEN SATURATION: 91 % | RESPIRATION RATE: 12 BRPM | TEMPERATURE: 99.5 F | WEIGHT: 131.61 LBS | HEIGHT: 61 IN | HEART RATE: 116 BPM

## 2019-12-10 DIAGNOSIS — J10.1 INFLUENZA B: ICD-10-CM

## 2019-12-10 LAB
FLUAV RNA SPEC QL NAA+PROBE: NEGATIVE
FLUBV RNA SPEC QL NAA+PROBE: POSITIVE

## 2019-12-10 PROCEDURE — 99283 EMERGENCY DEPT VISIT LOW MDM: CPT

## 2019-12-10 PROCEDURE — 87502 INFLUENZA DNA AMP PROBE: CPT

## 2019-12-10 RX ORDER — OSELTAMIVIR PHOSPHATE 75 MG/1
75 CAPSULE ORAL 2 TIMES DAILY
Qty: 10 CAP | Refills: 0 | Status: SHIPPED | OUTPATIENT
Start: 2019-12-10 | End: 2019-12-15

## 2019-12-10 ASSESSMENT — LIFESTYLE VARIABLES
TOTAL SCORE: 0
TOTAL SCORE: 0
EVER FELT BAD OR GUILTY ABOUT YOUR DRINKING: NO
DO YOU DRINK ALCOHOL: NO
TOTAL SCORE: 0
HAVE PEOPLE ANNOYED YOU BY CRITICIZING YOUR DRINKING: NO
CONSUMPTION TOTAL: INCOMPLETE
EVER HAD A DRINK FIRST THING IN THE MORNING TO STEADY YOUR NERVES TO GET RID OF A HANGOVER: NO
HAVE YOU EVER FELT YOU SHOULD CUT DOWN ON YOUR DRINKING: NO

## 2019-12-10 NOTE — ED TRIAGE NOTES
Chief Complaint   Patient presents with   • Flu Like Symptoms   pt reports general body achiness and fever on off for several weeks. +non productive cough

## 2019-12-10 NOTE — ED PROVIDER NOTES
ED Provider Note    CHIEF COMPLAINT  Chief Complaint   Patient presents with   • Flu Like Symptoms       HPI  Evelyn Priest is a 19 y.o. female who presents for evaluation of cough and fever.  She states that the symptoms started last night.  Her cough is been minimally productive.  She has generalized body aches.  She states that over the past few weeks she has had some intermittent fevers.  She is had no vomiting or diarrhea.  She denies any significant past medical history.    REVIEW OF SYSTEMS  See HPI for further details. All other systems negative.    PAST MEDICAL HISTORY  Past Medical History:   Diagnosis Date   • Pregnant        FAMILY HISTORY  Family History   Problem Relation Age of Onset   • Diabetes Mother        SOCIAL HISTORY  Social History     Socioeconomic History   • Marital status: Single     Spouse name: Not on file   • Number of children: Not on file   • Years of education: Not on file   • Highest education level: Not on file   Occupational History   • Not on file   Social Needs   • Financial resource strain: Not on file   • Food insecurity:     Worry: Not on file     Inability: Not on file   • Transportation needs:     Medical: Not on file     Non-medical: Not on file   Tobacco Use   • Smoking status: Never Smoker   • Smokeless tobacco: Never Used   Substance and Sexual Activity   • Alcohol use: No   • Drug use: No   • Sexual activity: Not on file   Lifestyle   • Physical activity:     Days per week: Not on file     Minutes per session: Not on file   • Stress: Not on file   Relationships   • Social connections:     Talks on phone: Not on file     Gets together: Not on file     Attends Jain service: Not on file     Active member of club or organization: Not on file     Attends meetings of clubs or organizations: Not on file     Relationship status: Not on file   • Intimate partner violence:     Fear of current or ex partner: Not on file     Emotionally abused: Not on file      "Physically abused: Not on file     Forced sexual activity: Not on file   Other Topics Concern   • Not on file   Social History Narrative   • Not on file       SURGICAL HISTORY  No past surgical history on file.    CURRENT MEDICATIONS  Home Medications     Reviewed by Stella Schumacher R.N. (Registered Nurse) on 12/10/19 at 1034  Med List Status: Partial   Medication Last Dose Status   ibuprofen (MOTRIN) 600 MG Tab  Active                ALLERGIES  No Known Allergies    PHYSICAL EXAM  VITAL SIGNS: BP (!) 177/79   Pulse 92   Temp 37.5 °C (99.5 °F) (Oral)   Resp 16   Ht 1.549 m (5' 1\")   Wt 59.7 kg (131 lb 9.8 oz)   LMP 11/04/2019   SpO2 99%   BMI 24.87 kg/m²   Constitutional: Well developed, Well nourished, No acute distress, Non-toxic appearance.   HENT: Normocephalic, Atraumatic.  Eyes:  EOMI, Conjunctiva normal, No discharge.   Cardiovascular: Normal heart rate, Normal rhythm, No murmurs, No rubs, No gallops.   Thorax & Lungs: Clear to auscultation without wheezes, rales, or rhonchi.  Abdomen: Soft and nontender.  Skin: Warm, Dry.  Musculoskeletal: Good range of motion in all major joints.  Neurologic: Awake and alert, No focal deficits noted.       COURSE & MEDICAL DECISION MAKING  Pertinent Labs & Imaging studies reviewed. (See chart for details)  This 19-year-old here for evaluation of cough and fever.  She is not hypoxemic.  She is not febrile at the time of evaluation.  Her breath sounds are clear.  Influenza test is come back positive for influenza B.  I discussed the results of the studies with the patient.  I will provide a prescription for Tamiflu but I discussed its limitations with the patient.  I also discussed the use of black elderberry extract.  She is given a discharge instruction sheet on influenza.  She should return here for any worsening symptoms.    FINAL IMPRESSION  1.  Influenza B  2.   3.         Electronically signed by: Chidi Wilson, 12/10/2019 11:20 AM    "

## 2019-12-10 NOTE — ED NOTES
Evelyn Priest discharged via ambulation with self.  Discharge instructions given and reviewed, patient educated to follow up with PCP, verbalized understanding.  Prescriptions given x 1.  All personal belongings in possession.  No questions at this time.

## 2020-02-02 ENCOUNTER — HOSPITAL ENCOUNTER (EMERGENCY)
Facility: MEDICAL CENTER | Age: 20
End: 2020-02-02
Attending: EMERGENCY MEDICINE
Payer: COMMERCIAL

## 2020-02-02 VITALS
TEMPERATURE: 97.8 F | RESPIRATION RATE: 16 BRPM | SYSTOLIC BLOOD PRESSURE: 118 MMHG | DIASTOLIC BLOOD PRESSURE: 80 MMHG | WEIGHT: 122.36 LBS | OXYGEN SATURATION: 98 % | BODY MASS INDEX: 23.12 KG/M2 | HEART RATE: 109 BPM

## 2020-02-02 DIAGNOSIS — K12.0 CANKER SORES ORAL: ICD-10-CM

## 2020-02-02 PROCEDURE — 99283 EMERGENCY DEPT VISIT LOW MDM: CPT

## 2020-02-02 PROCEDURE — 700102 HCHG RX REV CODE 250 W/ 637 OVERRIDE(OP): Performed by: EMERGENCY MEDICINE

## 2020-02-02 PROCEDURE — A9270 NON-COVERED ITEM OR SERVICE: HCPCS | Performed by: EMERGENCY MEDICINE

## 2020-02-02 RX ORDER — DEXAMETHASONE 4 MG/1
12 TABLET ORAL ONCE
Status: COMPLETED | OUTPATIENT
Start: 2020-02-02 | End: 2020-02-02

## 2020-02-02 RX ORDER — IBUPROFEN 600 MG/1
600 TABLET ORAL EVERY 6 HOURS PRN
Qty: 30 TAB | Refills: 0 | Status: SHIPPED | OUTPATIENT
Start: 2020-02-02

## 2020-02-02 RX ORDER — IBUPROFEN 600 MG/1
600 TABLET ORAL ONCE
Status: COMPLETED | OUTPATIENT
Start: 2020-02-02 | End: 2020-02-02

## 2020-02-02 RX ADMIN — IBUPROFEN 600 MG: 600 TABLET ORAL at 12:00

## 2020-02-02 RX ADMIN — DEXAMETHASONE 12 MG: 4 TABLET ORAL at 12:00

## 2020-02-02 NOTE — ED PROVIDER NOTES
ED Provider Note      CHIEF COMPLAINT  Chief Complaint   Patient presents with   • Mouth Pain       HPI  Evelyn Priest is a 19 y.o. female who presents with a chief complaint of mouth pain duration 3 days she describes it as ulcers on her lips and back of her mouth the worse ones in the right underbite right lower near her premolar.  It is worse when she is eating.  There is no alleviating factors she is not taking Motrin ibuprofen or any medicines at this time.  She comes in today complaining of the mouth pain    We had a long discussion patient states that she had a regular checkup 3 weeks ago she is checking for STDs she was slightly concerned that she might have an STD he was worried if this was genital herpes.  She never had these before no sick contacts at home.  REVIEW OF SYSTEMS  General: No fever or chills.    Ear nose throat: See above.  She is able to drink and eat.  Pain underneath her neck.  Pulmonary: No shortness of breath or cough.  Hologic lymphatic: The patient states that she does have swollen lymph nodes in either neck.  Dermatologic: No rashes on the hands or the rest of the body  Neurologic: No numbness or tingling      PAST MEDICAL HISTORY  History reviewed. No pertinent past medical history.    FAMILY HISTORY  Family History   Problem Relation Age of Onset   • Diabetes Mother        SOCIAL HISTORY  Social History     Socioeconomic History   • Marital status: Single     Spouse name: Not on file   • Number of children: Not on file   • Years of education: Not on file   • Highest education level: Not on file   Occupational History   • Not on file   Social Needs   • Financial resource strain: Not on file   • Food insecurity:     Worry: Not on file     Inability: Not on file   • Transportation needs:     Medical: Not on file     Non-medical: Not on file   Tobacco Use   • Smoking status: Never Smoker   • Smokeless tobacco: Never Used   Substance and Sexual Activity   • Alcohol use: Yes    • Drug use: No   • Sexual activity: Not on file   Lifestyle   • Physical activity:     Days per week: Not on file     Minutes per session: Not on file   • Stress: Not on file   Relationships   • Social connections:     Talks on phone: Not on file     Gets together: Not on file     Attends Latter day service: Not on file     Active member of club or organization: Not on file     Attends meetings of clubs or organizations: Not on file     Relationship status: Not on file   • Intimate partner violence:     Fear of current or ex partner: Not on file     Emotionally abused: Not on file     Physically abused: Not on file     Forced sexual activity: Not on file   Other Topics Concern   • Not on file   Social History Narrative   • Not on file       SURGICAL HISTORY  History reviewed. No pertinent surgical history.    CURRENT MEDICATIONS  Home Medications     Reviewed by Deborah Zaman R.N. (Registered Nurse) on 02/02/20 at 1111  Med List Status: Complete   Medication Last Dose Status        Patient Lazaro Taking any Medications                       ALLERGIES  No Known Allergies    PHYSICAL EXAM  VITAL SIGNS: /79   Pulse (!) 120   Temp 36.6 °C (97.8 °F) (Temporal)   Resp 16   Wt 55.5 kg (122 lb 5.7 oz)   SpO2 98%   BMI 23.12 kg/m²    Constitutional: Well developed, Well nourished, no acute distress, slightly uncomfortable appearing  HENT: Patient has no gross swelling there is no lesions noted in the in the hard palate posterior pharynx shows no exudates no erythema.  There is a multiple office ulcers some on the lower lips and upper lips right lower buccal mucosa near her tooth.  There is also some underneath her tongue.  Minimal erythema noted around that area.  Most of them appear to be stages of healing.  Eyes: PERRLA, EOMI, Conjunctiva normal, No discharge.   Musculoskeletal: Neck normal range of motion, No tenderness, Supple,   Thorax & Lungs: No respiratory distress no muffled voice  Skin: No new  lesions noted on the hands.  Abrasions on the dorsum of the left hand  Psychiatric: Slightly uncomfortable appearing minimally anxious  Hematologic/lymphatic: Patient has have cervical lymphadenopathy it is noted slightly tender no significant mass isolated is noted.    RADIOLOGY/PROCEDURES  Patient received Decadron and Motrin by mouth    COURSE & MEDICAL DECISION MAKING  Pertinent Labs & Imaging studies reviewed. 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  Heart rate seems improved nicely to 1/21/2009 the patient seems slightly anxious but now improved.  I do not see the patient has sepsis or underlying bacterial infection    The patient was anxious about STDs we talked about HSV and HSV 1 and 2 how they both can get mouth lesions but again with no genital lesions I suspicion is low risk we talked about the outcome of most viruses and especially HSV 1 and 2 and that most of these go away they get better use of the first 1 is the worst.  It may be recurrent if it continues to recur then obviously taking medication daily to suppress will be needed.  Recommend no kissing at this time no oral intercourse.    This a very pleasant female comes in with mouth ulcers slight tachycardia is resolving nicely who seems slightly anxious and uncomfortable is taking no pain medicine.    I find no reason to do blood work imaging at this time the patient understands if she gets increased facial swelling or any throat swelling she is to return to the ER.  Patient this point will be discharged home  FINAL IMPRESSION  1. Mouth ulcers    2.   3.      Electronically signed by: Nathen Del Real M.D., 2/2/2020 11:35 AM

## 2020-02-02 NOTE — ED TRIAGE NOTES
Pt comes in complaining of sores to her mouth for approx 1 week. Pt also reporting painful lymph nodes

## 2020-05-16 ENCOUNTER — HOSPITAL ENCOUNTER (EMERGENCY)
Facility: MEDICAL CENTER | Age: 20
End: 2020-05-16
Attending: EMERGENCY MEDICINE
Payer: COMMERCIAL

## 2020-05-16 ENCOUNTER — APPOINTMENT (OUTPATIENT)
Dept: RADIOLOGY | Facility: MEDICAL CENTER | Age: 20
End: 2020-05-16
Attending: EMERGENCY MEDICINE
Payer: COMMERCIAL

## 2020-05-16 VITALS
RESPIRATION RATE: 18 BRPM | TEMPERATURE: 97.3 F | DIASTOLIC BLOOD PRESSURE: 74 MMHG | BODY MASS INDEX: 23.89 KG/M2 | SYSTOLIC BLOOD PRESSURE: 105 MMHG | HEART RATE: 92 BPM | WEIGHT: 126.54 LBS | HEIGHT: 61 IN | OXYGEN SATURATION: 96 %

## 2020-05-16 DIAGNOSIS — S52.101A CLOSED FRACTURE OF PROXIMAL END OF RIGHT RADIUS, UNSPECIFIED FRACTURE MORPHOLOGY, INITIAL ENCOUNTER: ICD-10-CM

## 2020-05-16 PROCEDURE — 302874 HCHG BANDAGE ACE 2 OR 3"": Mod: EDC

## 2020-05-16 PROCEDURE — 73080 X-RAY EXAM OF ELBOW: CPT | Mod: RT

## 2020-05-16 PROCEDURE — 99284 EMERGENCY DEPT VISIT MOD MDM: CPT | Mod: EDC

## 2020-05-16 PROCEDURE — 29105 APPLICATION LONG ARM SPLINT: CPT | Mod: EDC

## 2020-05-16 NOTE — ED NOTES
Posterior long splint applied to pt's right arm. Pt denied any pain or discomfort after application. Distal CMS was intact. Pt advised to keep the splint clean and dry. Sling applied to arm and adjusted to the pt.

## 2020-05-16 NOTE — ED PROVIDER NOTES
ED Provider Note    CHIEF COMPLAINT  Chief Complaint   Patient presents with   • T-5000 Extremity Pain     Pt was roller skating and fell onto her rt hand.  Hand up to elbow is painful.         HPI  Evelyn Priest is a 19 y.o. female who presents evaluation of right elbow pain after a fall onto an outstretched arm rollerskating this afternoon.  The patient denies any injury to the head neck chest abdomen pelvis.  She denies pain in the wrist or hand.  She has no other complaints.    REVIEW OF SYSTEMS  See HPI for further details.  Numbness weakness or tingling all other systems are negative.     PAST MEDICAL HISTORY  No past medical history on file.  None reported  FAMILY HISTORY  Noncontributory    SOCIAL HISTORY  Social History     Socioeconomic History   • Marital status: Single     Spouse name: Not on file   • Number of children: Not on file   • Years of education: Not on file   • Highest education level: Not on file   Occupational History   • Not on file   Social Needs   • Financial resource strain: Not on file   • Food insecurity     Worry: Not on file     Inability: Not on file   • Transportation needs     Medical: Not on file     Non-medical: Not on file   Tobacco Use   • Smoking status: Never Smoker   • Smokeless tobacco: Never Used   Substance and Sexual Activity   • Alcohol use: Yes   • Drug use: No   • Sexual activity: Not on file   Lifestyle   • Physical activity     Days per week: Not on file     Minutes per session: Not on file   • Stress: Not on file   Relationships   • Social connections     Talks on phone: Not on file     Gets together: Not on file     Attends Christian service: Not on file     Active member of club or organization: Not on file     Attends meetings of clubs or organizations: Not on file     Relationship status: Not on file   • Intimate partner violence     Fear of current or ex partner: Not on file     Emotionally abused: Not on file     Physically abused: Not on file     " Forced sexual activity: Not on file   Other Topics Concern   • Not on file   Social History Narrative   • Not on file       SURGICAL HISTORY  No past surgical history on file.    CURRENT MEDICATIONS  Home Medications     Reviewed by Brittny Iyer R.N. (Registered Nurse) on 05/16/20 at 1454  Med List Status: Complete   Medication Last Dose Status   ibuprofen (MOTRIN) 600 MG Tab  Active                ALLERGIES  No Known Allergies    PHYSICAL EXAM  VITAL SIGNS: /70   Pulse 67   Temp 36.4 °C (97.5 °F) (Temporal)   Resp 16   Ht 1.549 m (5' 1\")   Wt 57.4 kg (126 lb 8.7 oz)   LMP 04/16/2020 (Exact Date)   SpO2 99%   BMI 23.91 kg/m²       Constitutional: Well developed, Well nourished, No acute distress, Non-toxic appearance.   HENT: Normocephalic, Atraumatic, Bilateral external ears normal, Oropharynx moist, No oral exudates, Nose normal.   Eyes: PERRLA, EOMI, Conjunctiva normal, No discharge.   Neck: Normal range of motion, No tenderness, Supple, No stridor.    Cardiovascular: Normal heart rate, Normal rhythm, No murmurs, No rubs, No gallops.   Thorax & Lungs: Normal breath sounds, No respiratory distress, No wheezing, No chest tenderness.   Abdomen: Bowel sounds normal, Soft, No tenderness, No masses, No pulsatile masses.   Skin: Warm, Dry, No erythema, No rash.   Extremities: Bony tenderness noted to the right olecranon process and radial head compartments of the elbow are soft no bony tenderness noted to the wrist hand and digits  Neurologic: Alert & oriented x 3, Normal motor function, Normal sensory function, No focal deficits noted.   Psychiatric: Affect normal, Judgment normal, Mood normal.   DX-ELBOW-COMPLETE 3+ RIGHT   Final Result      1.  Probable subtle RIGHT radial head fracture, with associated hemarthrosis.   2.  Dislocation.            COURSE & MEDICAL DECISION MAKING  Pertinent Labs & Imaging studies reviewed. (See chart for details)  Patient has radiograph consistent with likely " subtle radial head fracture and this is clinically correlated with where her tenderness is.  We will place her in a well-padded posterior long-arm splint and an arm sling.  She will be referred to orthopedics for follow-up in 2 to 3 days    FINAL IMPRESSION  1.  Acute closed right-sided radial head fracture         Electronically signed by: Nahum Perez M.D., 5/16/2020 3:05 PM

## 2020-05-16 NOTE — ED TRIAGE NOTES
Chief Complaint   Patient presents with   • T-5000 Extremity Pain     Pt was roller skating and fell onto her rt hand.  Hand up to elbow is painful.

## 2020-05-16 NOTE — ED NOTES
"Evelyn Priest has been discharged from the Emergency Room.    Discharge instructions, which include signs and symptoms to monitor patient for, as well as detailed information regarding right radius fracture provided.  All questions and concerns addressed at this time.      Patient verbalizes understanding that it is very important to follow up with orthopedics, Dr. Tilley's office contact information with phone number and address provided.  Splint and sling assessed prior to discharge, CMS intact.    Patient leaves ER in no apparent distress. This RN provided education regarding returning to the ER for any new concerns or changes in patient's condition.      /74   Pulse 92   Temp 36.3 °C (97.3 °F) (Temporal)   Resp 18   Ht 1.549 m (5' 1\")   Wt 57.4 kg (126 lb 8.7 oz)   LMP 04/16/2020 (Exact Date)   SpO2 96%   BMI 23.91 kg/m²       "

## 2020-05-16 NOTE — ED NOTES
First interaction with patient.  Assumed care of patient at this time.  Patient states that approx 1 hour ago, she was roller skating and fell forward with her arms extended in front of her.  Patient now complains of right elbow pain.  No swelling, bruising, abrasion, or deformity noted.  CMS intact.  Patient verbalizes understanding of NPO status.  Call light provided.  Chart up for ERP.

## 2021-05-19 ENCOUNTER — HOSPITAL ENCOUNTER (EMERGENCY)
Facility: MEDICAL CENTER | Age: 21
End: 2021-05-19
Attending: EMERGENCY MEDICINE
Payer: COMMERCIAL

## 2021-05-19 ENCOUNTER — NON-PROVIDER VISIT (OUTPATIENT)
Dept: OCCUPATIONAL MEDICINE | Facility: CLINIC | Age: 21
End: 2021-05-19

## 2021-05-19 VITALS
TEMPERATURE: 96.8 F | SYSTOLIC BLOOD PRESSURE: 105 MMHG | BODY MASS INDEX: 24.02 KG/M2 | OXYGEN SATURATION: 97 % | DIASTOLIC BLOOD PRESSURE: 59 MMHG | RESPIRATION RATE: 16 BRPM | HEIGHT: 61 IN | WEIGHT: 127.21 LBS | HEART RATE: 77 BPM

## 2021-05-19 DIAGNOSIS — Z02.1 PRE-EMPLOYMENT DRUG SCREENING: ICD-10-CM

## 2021-05-19 DIAGNOSIS — S61.210A LACERATION OF RIGHT INDEX FINGER WITHOUT FOREIGN BODY WITHOUT DAMAGE TO NAIL, INITIAL ENCOUNTER: ICD-10-CM

## 2021-05-19 DIAGNOSIS — Z02.83 ENCOUNTER FOR DRUG SCREENING: ICD-10-CM

## 2021-05-19 PROCEDURE — 700101 HCHG RX REV CODE 250: Performed by: EMERGENCY MEDICINE

## 2021-05-19 PROCEDURE — 304999 HCHG REPAIR-SIMPLE/INTERMED LEVEL 1

## 2021-05-19 PROCEDURE — 303747 HCHG EXTRA SUTURE

## 2021-05-19 PROCEDURE — 80305 DRUG TEST PRSMV DIR OPT OBS: CPT | Performed by: PREVENTIVE MEDICINE

## 2021-05-19 PROCEDURE — 8899 PR URINE 11 PANEL - AFTER HOURS: Performed by: PREVENTIVE MEDICINE

## 2021-05-19 PROCEDURE — 99282 EMERGENCY DEPT VISIT SF MDM: CPT

## 2021-05-19 PROCEDURE — 82075 ASSAY OF BREATH ETHANOL: CPT | Performed by: PREVENTIVE MEDICINE

## 2021-05-19 PROCEDURE — 304217 HCHG IRRIGATION SYSTEM

## 2021-05-19 RX ORDER — LIDOCAINE HYDROCHLORIDE 10 MG/ML
20 INJECTION, SOLUTION INFILTRATION; PERINEURAL ONCE
Status: COMPLETED | OUTPATIENT
Start: 2021-05-19 | End: 2021-05-19

## 2021-05-19 RX ADMIN — LIDOCAINE HYDROCHLORIDE 20 ML: 10 INJECTION, SOLUTION INFILTRATION; PERINEURAL at 05:52

## 2021-05-19 NOTE — ED NOTES
"Pt discharged home. Pt is A/O x 4, pt verbalized understanding. Pt is ambulatory with a steady gait. Patient in possession of belongings. Pt provided discharge education and information pertaining to medications and follow up appointments. Discussed signs and symptoms to return to the ER, patient verbalized understanding. Provided pt extra supply for dressing change, educated patient on suture, pt verbalized understanding. Pt received copy of discharge instructions and verbalized understanding.     /59   Pulse 77   Temp 36 °C (96.8 °F) (Temporal)   Resp 16   Ht 1.549 m (5' 1\")   Wt 57.7 kg (127 lb 3.3 oz)   LMP 05/10/2021   SpO2 97%   BMI 24.04 kg/m²     "

## 2021-05-19 NOTE — ED PROVIDER NOTES
"\"    CHIEF COMPLAINT  Chief Complaint   Patient presents with   • T-5000 Lacerations     c/o right index finger laceration. patient states it happened at work. sharp metal cut her finger. bleeding controlled. last tetaus shot 2 years ago.       HPI  Evelyn Priest is a 20 y.o. female who presents with a laceration on right index finger.  Cut on a sharp piece of metal.  No numbness tingling weakness still able to move the digit.  Tetanus shot is current.  Has discomfort at the area since the event just prior to arrival.    REVIEW OF SYSTEMS  No numbness or weakness    PAST MEDICAL HISTORY  History reviewed. No pertinent past medical history.    SOCIAL HISTORY  Social History     Tobacco Use   • Smoking status: Never Smoker   • Smokeless tobacco: Never Used   Vaping Use   • Vaping Use: Never used   Substance Use Topics   • Alcohol use: Yes   • Drug use: No       CURRENT MEDICATIONS  Home Medications     Reviewed by Tho Lyle R.N. (Registered Nurse) on 05/19/21 at 0433  Med List Status: Partial   Medication Last Dose Status   ibuprofen (MOTRIN) 600 MG Tab  Active                ALLERGIES  No Known Allergies    PHYSICAL EXAM  VITAL SIGNS: /59   Pulse 77   Temp 36 °C (96.8 °F) (Temporal)   Resp 16   Ht 1.549 m (5' 1\")   Wt 57.7 kg (127 lb 3.3 oz)   LMP 05/10/2021   SpO2 97%   BMI 24.04 kg/m²    Constitutional: No distress  HENT:  Atraumatic   Eyes: Normal inspection  Cardiovascular: Normal heart rate  Thorax & Lungs: No respiratory distress  Skin: Laceration volar surface mid phalanx right index finger.  This does not extend to the tendon  Extremities: As mentioned above  Neurologic: Normal sensory and motor  Psychiatric: Affect normal      COURSE & MEDICAL DECISION MAKING  Patient is neurovascularly intact. No foreign bodies. No motor dysfunction.    Laceration Repair Procedure Note    Indication: Laceration    Procedure: The patient was placed in the appropriate position and anesthesia " "around the laceration was obtained by infiltration using 1% Lidocaine without epinephrine. The area was then irrigated with normal saline. The laceration was closed with 4-0 Ethilon using interrupted sutures. There were no additional lacerations requiring repair. The wound area was then dressed with bacitracin and a sterile dressing.        Total repaired wound length: 2 cm.     The patient tolerated the procedure well.    Patient instructed to have his stitches out in 7 days.  Referred to occupational health should wash wound twice daily. Keep clean at all times. Return to the emergency department for any signs of infection, including: Fevers, expanding redness, purulent drainage.    FINAL IMPRESSION  1. Laceration       This dictation was created using voice recognition software. The accuracy of the dictation is limited to the abilities of the software. I expect there may be some errors of grammar and possibly content. The nursing notes were reviewed and certain aspects of this information were incorporated into this note.    Electronically signed by: Balwinder Jones M.D., 5/19/2021 9:25 AM  \"    "

## 2021-05-19 NOTE — ED TRIAGE NOTES
Evelyn Priest  20 y.o.  female  Chief Complaint   Patient presents with   • T-5000 Lacerations     c/o right index finger laceration. patient states it happened at work. sharp metal cut her finger. bleeding controlled. last tetaus shot 2 years ago.

## 2021-05-19 NOTE — LETTER
"  FORM C-4:  EMPLOYEE’S CLAIM FOR COMPENSATION/ REPORT OF INITIAL TREATMENT  EMPLOYEE’S CLAIM - PROVIDE ALL INFORMATION REQUESTED   First Name Evelyn Last Name Bret Priest Birthdate 2000  Sex female Claim Number   Home Address [address]  [address]   [place]             [place] [address]                                   Age  20 y.o. Height  1.549 m (5' 1\") Weight  57.7 kg (127 lb 3.3 oz) [facility]     Mailing Address [address]  [address]  [place]              Zip [address] Telephone  [phone] (home)  Primary Language Spoken  English   Insurer  [employer]   ALL Employee's Occupation (Job Title) When Injury or Occupational Disease Occurred  [employer]   Employer's Name [employer] Telephone [phone]    Employer Address [address] [facility] [place] [facility] [[address]] Zip [address]   Date of Injury  5/19/2021       Hour of Injury  2:00 AM Date Employer Notified  5/19/2021 Last Day of Work after Injury or Occupational Disease  5/19/2021 Supervisor to Whom Injury Reported  Jose Roberto Liu   Address or Location of Accident (if applicable) Work [1]   What were you doing at the time of accident? (if applicable) changing an anode tab    How did this injury or occupational disease occur? Be specific and answer in detail. Use additional sheet if necessary)  I was changing an anode tab, while it was feeding, it got stuck  so i pulled it and cut my finger   If you believe that you have an occupational disease, when did you first have knowledge of the disability and it relationship to your employment? n/a Witnesses to the Accident  none   Nature of Injury or Occupational Disease  Workers' Compensation Part(s) of Body Injured or Affected  Finger (R), N/A, N/A    I CERTIFY THAT THE ABOVE IS TRUE AND CORRECT TO THE BEST OF MY KNOWLEDGE AND THAT I HAVE PROVIDED THIS INFORMATION IN ORDER TO OBTAIN THE BENEFITS OF [place] IN[place] OR CHAPTER [place]).  I HEREBY AUTHORIZE ANY PHYSICIAN, CHIROPRACTOR, SURGEON, PRACTITIONER, OR OTHER PERSON, ANY HOSPITAL, INCLUDING [facility] OR [facility], ANY MEDICAL SERVICE ORGANIZATION, ANY INSURANCE COMPANY, OR OTHER INSTITUTION OR ORGANIZATION TO RELEASE TO EACH OTHER, ANY MEDICAL OR OTHER INFORMATION, INCLUDING BENEFITS PAID OR PAYABLE, PERTINENT TO THIS INJURY OR DISEASE, EXCEPT INFORMATION RELATIVE TO DIAGNOSIS, TREATMENT AND/OR COUNSELING FOR AIDS, PSYCHOLOGICAL CONDITIONS, ALCOHOL OR CONTROLLED SUBSTANCES, FOR WHICH I MUST GIVE SPECIFIC AUTHORIZATION.  A PHOTOSTAT OF THIS AUTHORIZATION SHALL BE AS VALID AS THE ORIGINAL.  Date                                      Place                                                                             Employee’s Signature   THIS REPORT MUST BE COMPLETED AND MAILED WITHIN 3 WORKING DAYS OF TREATMENT   Place [facility], EMERGENCY DEPT                       Name of Facility [facility]   Date  5/19/2021 Diagnosis  (S61.210A) Laceration of right index finger without foreign body without damage to nail, initial encounter Is there evidence the injured employee was under the influence of alcohol and/or another controlled substance at the time of accident?   Hour  6:23 AM Description of Injury or Disease  Laceration of right index finger without foreign body without damage to nail, initial encounter No   Treatment  Wound repair  Have you advised the patient to remain off work five days or more?         No   X-Ray Findings    If Yes   From Date    To Date      From information given by the employee, together with medical evidence, can you directly connect this injury or occupational disease as job incurred? Yes If No, is employee capable of: Full Duty  No Modified Duty  Yes   Is additional medical care by a physician  "indicated? Yes If Modified Duty, Specify any Limitations / Restrictions   Light use of right hand until cleared by occupational health   Do you know of any previous injury or disease contributing to this condition or occupational disease? No    Date 5/19/2021 Print Doctor’s Name Solomon Jones certify the employer’s copy of this form was mailed on:   Address [address] [place] [address]  [place] [id number] [phone] INSURER’S USE ONLY   Provider’s Tax ID Number   Telephone Dept: [phone]    Doctor’s Signature e-SOLOMON Allan M.D. Degree     M.D.      Form C-4 (rev.10/07)                                                                         BRIEF DESCRIPTION OF RIGHTS AND BENEFITS  (Pursuant to [place] 616C.050)    Notice of Injury or Occupational Disease (Incident Report Form C-1): If an injury or occupational disease (OD) arises out of and in the course of employment, you must provide written notice to your employer as soon as practicable, but no later than 7 days after the accident or OD. Your employer shall maintain a sufficient supply of the required forms.    Claim for Compensation (Form C-4): If medical treatment is sought, the form C-4 is available at the place of initial treatment. A completed \"Claim for Compensation\" (Form C-4) must be filed within 90 days after an accident or OD. The treating physician or chiropractor must, within 3 working days after treatment, complete and mail to the employer, the employer's insurer and third-party , the Claim for Compensation.    Medical Treatment: If you require medical treatment for your on-the-job injury or OD, you may be required to select a physician or chiropractor from a list provided by your workers’ compensation insurer, if it has contracted with an Organization for Managed Care (MCO) or Preferred Provider Organization (PPO) or providers of health care. If your employer has not entered into a contract with an MCO or PPO, you may select a " physician or chiropractor from the Panel of Physicians and Chiropractors. Any medical costs related to your industrial injury or OD will be paid by your insurer.    Temporary Total Disability (TTD): If your doctor has certified that you are unable to work for a period of at least 5 consecutive days, or 5 cumulative days in a 20-day period, or places restrictions on you that your employer does not accommodate, you may be entitled to TTD compensation.    Temporary Partial Disability (TPD): If the wage you receive upon reemployment is less than the compensation for TTD to which you are entitled, the insurer may be required to pay you TPD compensation to make up the difference. TPD can only be paid for a maximum of 24 months.    Permanent Partial Disability (PPD): When your medical condition is stable and there is an indication of a PPD as a result of your injury or OD, within 30 days, your insurer must arrange for an evaluation by a rating physician or chiropractor to determine the degree of your PPD. The amount of your PPD award depends on the date of injury, the results of the PPD evaluation, your age and wage.    Permanent Total Disability (PTD): If you are medically certified by a treating physician or chiropractor as permanently and totally disabled and have been granted a PTD status by your insurer, you are entitled to receive monthly benefits not to exceed 66 2/3% of your average monthly wage. The amount of your PTD payments is subject to reduction if you previously received a lump-sum PPD award.    Vocational Rehabilitation Services: You may be eligible for vocational rehabilitation services if you are unable to return to the job due to a permanent physical impairment or permanent restrictions as a result of your injury or occupational disease.    Transportation and Per Jo Reimbursement: You may be eligible for travel expenses and per jo associated with medical treatment.    Reopening: You may be able to  reopen your claim if your condition worsens after claim closure.     Appeal Process: If you disagree with a written determination issued by the insurer or the insurer does not respond to your request, you may appeal to the Department of Administration, , by following the instructions contained in your determination letter. You must appeal the determination within 70 days from the date of the determination letter at [address], [address], [place], [place] [address], or [address], [address], [place], [place] [address]. If you disagree with the  decision, you may appeal to the Department of Administration, . You must file your appeal within 30 days from the date of the  decision letter at [address], [address], [place], [place] [address], or [address] [facility] [address], [place], [place] [address]. If you disagree with a decision of an , you may file a petition for judicial review with the District Court. You must do so within 30 days of the Appeal Officer’s decision. You may be represented by an  at your own expense or you may contact the [facility] for possible representation.    [place]  for Injured Workers (NAIW): If you disagree with a  decision, you may request that NAIW represent you without charge at an  Hearing. For information regarding denial of benefits, you may contact the [facility] at: [address], [address], [place] [address], [phone], or [address], [place] [address], [phone]    To File a Complaint with the Division: If you wish to file a complaint with the  of the Division of Industrial Relations (DIR),  please contact the Workers’ Compensation Section, [address] [facility] [address], [place], [place] [address], telephone [phone], or [address] [facility] [facility] [address], [place], [place] [address],  telephone [phone].    For assistance with Workers’ Compensation Issues: You may contact the [facility] Office for Consumer Health Assistance, [facility] [facility] [facility] [address], [place] [address], Toll Free 1-389.378.6787, Web site: http://[facility].[place].gov/Programs/RITESH E-mail: ritesh@[facility].[place].gov  D-2 (rev. 10/20)              __________________________________________________________________                                    _________________            Employee Name / Signature                                                                                                                            Date

## 2021-05-20 LAB
AMP AMPHETAMINE: NORMAL
BAR BARBITURATES: NORMAL
BREATH ALCOHOL COMMENT: NORMAL
BZO BENZODIAZEPINES: NORMAL
COC COCAINE: NORMAL
INT CON NEG: NORMAL
INT CON POS: NORMAL
MDMA ECSTASY: NORMAL
MET METHAMPHETAMINES: NORMAL
MTD METHADONE: NORMAL
OPI OPIATES: NORMAL
OXY OXYCODONE: NORMAL
PCP PHENCYCLIDINE: NORMAL
POC BREATHALIZER: 0 PERCENT (ref 0–0.01)
POC URINE DRUG SCREEN OCDRS: NORMAL
THC: NORMAL

## 2022-08-19 ENCOUNTER — HOSPITAL ENCOUNTER (OUTPATIENT)
Dept: LAB | Facility: MEDICAL CENTER | Age: 22
End: 2022-08-19
Attending: STUDENT IN AN ORGANIZED HEALTH CARE EDUCATION/TRAINING PROGRAM
Payer: COMMERCIAL

## 2022-08-19 LAB
ALBUMIN SERPL BCP-MCNC: 4.1 G/DL (ref 3.2–4.9)
ALBUMIN/GLOB SERPL: 1.2 G/DL
ALP SERPL-CCNC: 59 U/L (ref 30–99)
ALT SERPL-CCNC: 12 U/L (ref 2–50)
ANION GAP SERPL CALC-SCNC: 9 MMOL/L (ref 7–16)
AST SERPL-CCNC: 17 U/L (ref 12–45)
BASOPHILS # BLD AUTO: 0.5 % (ref 0–1.8)
BASOPHILS # BLD: 0.04 K/UL (ref 0–0.12)
BILIRUB SERPL-MCNC: 0.2 MG/DL (ref 0.1–1.5)
BUN SERPL-MCNC: 9 MG/DL (ref 8–22)
CALCIUM SERPL-MCNC: 8.7 MG/DL (ref 8.5–10.5)
CHLORIDE SERPL-SCNC: 107 MMOL/L (ref 96–112)
CHOLEST SERPL-MCNC: 160 MG/DL (ref 100–199)
CO2 SERPL-SCNC: 22 MMOL/L (ref 20–33)
CREAT SERPL-MCNC: 0.65 MG/DL (ref 0.5–1.4)
EOSINOPHIL # BLD AUTO: 0.41 K/UL (ref 0–0.51)
EOSINOPHIL NFR BLD: 4.6 % (ref 0–6.9)
ERYTHROCYTE [DISTWIDTH] IN BLOOD BY AUTOMATED COUNT: 36.3 FL (ref 35.9–50)
FASTING STATUS PATIENT QL REPORTED: NORMAL
FERRITIN SERPL-MCNC: 67.5 NG/ML (ref 10–291)
GFR SERPLBLD CREATININE-BSD FMLA CKD-EPI: 126 ML/MIN/1.73 M 2
GLOBULIN SER CALC-MCNC: 3.3 G/DL (ref 1.9–3.5)
GLUCOSE SERPL-MCNC: 83 MG/DL (ref 65–99)
HCT VFR BLD AUTO: 43.7 % (ref 37–47)
HDLC SERPL-MCNC: 33 MG/DL
HGB BLD-MCNC: 15 G/DL (ref 12–16)
IMM GRANULOCYTES # BLD AUTO: 0.01 K/UL (ref 0–0.11)
IMM GRANULOCYTES NFR BLD AUTO: 0.1 % (ref 0–0.9)
IRON SATN MFR SERPL: 13 % (ref 15–55)
IRON SERPL-MCNC: 39 UG/DL (ref 40–170)
LDLC SERPL CALC-MCNC: 86 MG/DL
LYMPHOCYTES # BLD AUTO: 3.57 K/UL (ref 1–4.8)
LYMPHOCYTES NFR BLD: 40.5 % (ref 22–41)
MCH RBC QN AUTO: 30.4 PG (ref 27–33)
MCHC RBC AUTO-ENTMCNC: 34.3 G/DL (ref 33.6–35)
MCV RBC AUTO: 88.6 FL (ref 81.4–97.8)
MONOCYTES # BLD AUTO: 0.49 K/UL (ref 0–0.85)
MONOCYTES NFR BLD AUTO: 5.6 % (ref 0–13.4)
NEUTROPHILS # BLD AUTO: 4.3 K/UL (ref 2–7.15)
NEUTROPHILS NFR BLD: 48.7 % (ref 44–72)
NRBC # BLD AUTO: 0 K/UL
NRBC BLD-RTO: 0 /100 WBC
PLATELET # BLD AUTO: 232 K/UL (ref 164–446)
PMV BLD AUTO: 9.4 FL (ref 9–12.9)
POTASSIUM SERPL-SCNC: 4.5 MMOL/L (ref 3.6–5.5)
PROT SERPL-MCNC: 7.4 G/DL (ref 6–8.2)
RBC # BLD AUTO: 4.93 M/UL (ref 4.2–5.4)
SODIUM SERPL-SCNC: 138 MMOL/L (ref 135–145)
TIBC SERPL-MCNC: 300 UG/DL (ref 250–450)
TRIGL SERPL-MCNC: 207 MG/DL (ref 0–149)
TSH SERPL DL<=0.005 MIU/L-ACNC: 2.11 UIU/ML (ref 0.38–5.33)
UIBC SERPL-MCNC: 261 UG/DL (ref 110–370)
WBC # BLD AUTO: 8.8 K/UL (ref 4.8–10.8)

## 2022-08-19 PROCEDURE — 85025 COMPLETE CBC W/AUTO DIFF WBC: CPT

## 2022-08-19 PROCEDURE — 83540 ASSAY OF IRON: CPT

## 2022-08-19 PROCEDURE — 36415 COLL VENOUS BLD VENIPUNCTURE: CPT

## 2022-08-19 PROCEDURE — 86364 TISS TRNSGLTMNASE EA IG CLAS: CPT

## 2022-08-19 PROCEDURE — 82784 ASSAY IGA/IGD/IGG/IGM EACH: CPT

## 2022-08-19 PROCEDURE — 80061 LIPID PANEL: CPT

## 2022-08-19 PROCEDURE — 82728 ASSAY OF FERRITIN: CPT

## 2022-08-19 PROCEDURE — 84443 ASSAY THYROID STIM HORMONE: CPT

## 2022-08-19 PROCEDURE — 83550 IRON BINDING TEST: CPT

## 2022-08-19 PROCEDURE — 80053 COMPREHEN METABOLIC PANEL: CPT

## 2022-08-21 LAB
IGA SERPL-MCNC: 320 MG/DL (ref 68–408)
TTG IGA SER IA-ACNC: <2 U/ML (ref 0–3)

## 2023-01-25 ENCOUNTER — OFFICE VISIT (OUTPATIENT)
Dept: URGENT CARE | Facility: CLINIC | Age: 23
End: 2023-01-25
Payer: COMMERCIAL

## 2023-01-25 VITALS
DIASTOLIC BLOOD PRESSURE: 58 MMHG | SYSTOLIC BLOOD PRESSURE: 98 MMHG | TEMPERATURE: 97.3 F | HEART RATE: 86 BPM | HEIGHT: 61 IN | OXYGEN SATURATION: 97 % | BODY MASS INDEX: 24.55 KG/M2 | RESPIRATION RATE: 20 BRPM | WEIGHT: 130 LBS

## 2023-01-25 DIAGNOSIS — J02.0 STREP PHARYNGITIS: ICD-10-CM

## 2023-01-25 LAB
INT CON NEG: NORMAL
INT CON POS: NORMAL
S PYO AG THROAT QL: POSITIVE

## 2023-01-25 PROCEDURE — 99203 OFFICE O/P NEW LOW 30 MIN: CPT | Performed by: PHYSICIAN ASSISTANT

## 2023-01-25 PROCEDURE — 87880 STREP A ASSAY W/OPTIC: CPT | Performed by: PHYSICIAN ASSISTANT

## 2023-01-25 RX ORDER — NORELGESTROMIN AND ETHINYL ESTRADIOL 150; 35 UG/D; UG/D
PATCH TRANSDERMAL
COMMUNITY
Start: 2022-11-16

## 2023-01-25 RX ORDER — AMOXICILLIN 500 MG/1
500 CAPSULE ORAL 2 TIMES DAILY
Qty: 20 CAPSULE | Refills: 0 | Status: SHIPPED | OUTPATIENT
Start: 2023-01-25 | End: 2023-02-04

## 2023-01-25 RX ORDER — ESCITALOPRAM OXALATE 5 MG/1
TABLET ORAL
COMMUNITY
Start: 2022-12-04

## 2023-01-25 ASSESSMENT — ENCOUNTER SYMPTOMS
VOMITING: 0
EYE PAIN: 0
ABDOMINAL PAIN: 0
SINUS PAIN: 0
CONSTIPATION: 0
DIZZINESS: 0
HEADACHES: 0
CHILLS: 1
DIAPHORESIS: 0
SHORTNESS OF BREATH: 0
DIARRHEA: 0
FEVER: 1
EYE DISCHARGE: 0
EYE REDNESS: 0
NAUSEA: 0
SORE THROAT: 1
COUGH: 1
WHEEZING: 0

## 2023-01-25 ASSESSMENT — FIBROSIS 4 INDEX: FIB4 SCORE: 0.51

## 2023-01-26 NOTE — PROGRESS NOTES
"  Subjective:     Evelyn Priest  is a 22 y.o. female who presents for Pharyngitis (X 4 days, sore throat, chills, body ache, congestion, cough)         She presents today with pharyngitis, body aches, laryngitis, sinus congestion and cough x4 days.  She has been experiencing subjective fevers and chills.  Notes recent close sick contacts whom have had similar symptoms.  No history of asthma or other pulmonary pathology.  No chest pain or shortness of breath, no nausea or vomiting, no abdominal pain, no diarrhea.  Has not taken any medications for symptoms.     Review of Systems   Constitutional:  Positive for chills, fever and malaise/fatigue. Negative for diaphoresis.   HENT:  Positive for congestion and sore throat. Negative for ear discharge and sinus pain.    Eyes:  Negative for pain, discharge and redness.   Respiratory:  Positive for cough. Negative for shortness of breath and wheezing.    Cardiovascular:  Negative for chest pain.   Gastrointestinal:  Negative for abdominal pain, constipation, diarrhea, nausea and vomiting.   Neurological:  Negative for dizziness and headaches.    No Known Allergies  History reviewed. No pertinent past medical history.     Objective:   BP 98/58   Pulse 86   Temp 36.3 °C (97.3 °F) (Temporal)   Resp 20   Ht 1.549 m (5' 1\")   Wt 59 kg (130 lb)   SpO2 97%   BMI 24.56 kg/m²   Physical Exam  Vitals and nursing note reviewed.   Constitutional:       General: She is not in acute distress.     Appearance: Normal appearance. She is not ill-appearing, toxic-appearing or diaphoretic.   HENT:      Head: Normocephalic.      Right Ear: Tympanic membrane, ear canal and external ear normal. There is no impacted cerumen.      Left Ear: Tympanic membrane, ear canal and external ear normal. There is no impacted cerumen.      Nose: No congestion or rhinorrhea.      Mouth/Throat:      Mouth: Mucous membranes are moist.      Pharynx: No oropharyngeal exudate or posterior " oropharyngeal erythema.   Eyes:      General:         Right eye: No discharge.         Left eye: No discharge.      Conjunctiva/sclera: Conjunctivae normal.   Cardiovascular:      Rate and Rhythm: Normal rate and regular rhythm.   Pulmonary:      Effort: Pulmonary effort is normal. No respiratory distress.      Breath sounds: Normal breath sounds. No stridor. No wheezing or rhonchi.   Musculoskeletal:      Cervical back: Neck supple.   Lymphadenopathy:      Cervical: No cervical adenopathy.   Neurological:      General: No focal deficit present.      Mental Status: She is alert and oriented to person, place, and time.   Psychiatric:         Mood and Affect: Mood normal.         Behavior: Behavior normal.         Thought Content: Thought content normal.         Judgment: Judgment normal.           Diagnostic testing:    POC strep-positive    Assessment/Plan:     Encounter Diagnoses   Name Primary?    Strep pharyngitis           Plan for care for today's complaint includes amoxicillin for strep pharyngitis.  Did discuss appropriate hygiene techniques to prevent coinfection or reinfection.  Continue to monitor symptoms and return to urgent care or follow-up with primary care provider if symptoms remain ongoing.  Follow-up in the emergency department if symptoms become severe, ER precautions discussed in office today..  Prescription for amoxicillin provided.    See AVS Instructions below for written guidance provided to patient on after-visit management and care in addition to our verbal discussion during the visit.    Please note that this dictation was created using voice recognition software. I have attempted to correct all errors, but there may be sound-alike, spelling, grammar and possibly content errors that I did not discover before finalizing the note.    Tani Cameron PA-C

## 2024-01-21 ENCOUNTER — HOSPITAL ENCOUNTER (EMERGENCY)
Facility: MEDICAL CENTER | Age: 24
End: 2024-01-21
Attending: STUDENT IN AN ORGANIZED HEALTH CARE EDUCATION/TRAINING PROGRAM
Payer: COMMERCIAL

## 2024-01-21 VITALS
OXYGEN SATURATION: 98 % | SYSTOLIC BLOOD PRESSURE: 113 MMHG | BODY MASS INDEX: 24.81 KG/M2 | RESPIRATION RATE: 19 BRPM | WEIGHT: 131.39 LBS | HEIGHT: 61 IN | TEMPERATURE: 98.4 F | DIASTOLIC BLOOD PRESSURE: 75 MMHG | HEART RATE: 101 BPM

## 2024-01-21 DIAGNOSIS — N30.00 ACUTE CYSTITIS WITHOUT HEMATURIA: ICD-10-CM

## 2024-01-21 DIAGNOSIS — B34.9 VIRAL ILLNESS: ICD-10-CM

## 2024-01-21 DIAGNOSIS — Z34.92 SECOND TRIMESTER PREGNANCY: ICD-10-CM

## 2024-01-21 LAB
APPEARANCE UR: ABNORMAL
BACTERIA #/AREA URNS HPF: ABNORMAL /HPF
BILIRUB UR QL STRIP.AUTO: NEGATIVE
COLOR UR: YELLOW
EPI CELLS #/AREA URNS HPF: ABNORMAL /HPF
FLUAV RNA SPEC QL NAA+PROBE: NEGATIVE
FLUBV RNA SPEC QL NAA+PROBE: NEGATIVE
GLUCOSE UR STRIP.AUTO-MCNC: NEGATIVE MG/DL
HYALINE CASTS #/AREA URNS LPF: ABNORMAL /LPF
KETONES UR STRIP.AUTO-MCNC: 80 MG/DL
LEUKOCYTE ESTERASE UR QL STRIP.AUTO: NEGATIVE
MICRO URNS: ABNORMAL
NITRITE UR QL STRIP.AUTO: NEGATIVE
PH UR STRIP.AUTO: 6 [PH] (ref 5–8)
PROT UR QL STRIP: NEGATIVE MG/DL
RBC # URNS HPF: ABNORMAL /HPF
RBC UR QL AUTO: NEGATIVE
RSV RNA SPEC QL NAA+PROBE: NEGATIVE
SARS-COV-2 RNA RESP QL NAA+PROBE: NOTDETECTED
SP GR UR STRIP.AUTO: 1.02
UROBILINOGEN UR STRIP.AUTO-MCNC: 1 MG/DL
WBC #/AREA URNS HPF: ABNORMAL /HPF

## 2024-01-21 PROCEDURE — 700102 HCHG RX REV CODE 250 W/ 637 OVERRIDE(OP): Performed by: STUDENT IN AN ORGANIZED HEALTH CARE EDUCATION/TRAINING PROGRAM

## 2024-01-21 PROCEDURE — 99284 EMERGENCY DEPT VISIT MOD MDM: CPT

## 2024-01-21 PROCEDURE — 81001 URINALYSIS AUTO W/SCOPE: CPT

## 2024-01-21 PROCEDURE — 700105 HCHG RX REV CODE 258: Performed by: STUDENT IN AN ORGANIZED HEALTH CARE EDUCATION/TRAINING PROGRAM

## 2024-01-21 PROCEDURE — A9270 NON-COVERED ITEM OR SERVICE: HCPCS | Performed by: STUDENT IN AN ORGANIZED HEALTH CARE EDUCATION/TRAINING PROGRAM

## 2024-01-21 PROCEDURE — 0241U HCHG SARS-COV-2 COVID-19 NFCT DS RESP RNA 4 TRGT ED POC: CPT

## 2024-01-21 RX ORDER — CEPHALEXIN 500 MG/1
500 CAPSULE ORAL 2 TIMES DAILY
Qty: 10 CAPSULE | Refills: 0 | Status: ACTIVE | OUTPATIENT
Start: 2024-01-21 | End: 2024-01-26

## 2024-01-21 RX ORDER — SODIUM CHLORIDE 9 MG/ML
1000 INJECTION, SOLUTION INTRAVENOUS ONCE
Status: COMPLETED | OUTPATIENT
Start: 2024-01-21 | End: 2024-01-21

## 2024-01-21 RX ORDER — ONDANSETRON 4 MG/1
4 TABLET, ORALLY DISINTEGRATING ORAL EVERY 6 HOURS PRN
Qty: 10 TABLET | Refills: 0 | Status: SHIPPED | OUTPATIENT
Start: 2024-01-21

## 2024-01-21 RX ORDER — ACETAMINOPHEN 325 MG/1
650 TABLET ORAL ONCE
Status: COMPLETED | OUTPATIENT
Start: 2024-01-21 | End: 2024-01-21

## 2024-01-21 RX ADMIN — SODIUM CHLORIDE 1000 ML: 9 INJECTION, SOLUTION INTRAVENOUS at 19:22

## 2024-01-21 RX ADMIN — ACETAMINOPHEN 650 MG: 325 TABLET, FILM COATED ORAL at 19:24

## 2024-01-21 ASSESSMENT — FIBROSIS 4 INDEX: FIB4 SCORE: 0.53

## 2024-01-21 ASSESSMENT — PAIN DESCRIPTION - PAIN TYPE
TYPE: ACUTE PAIN
TYPE: ACUTE PAIN

## 2024-01-22 NOTE — ED PROVIDER NOTES
ED Provider Note    CHIEF COMPLAINT  Chief Complaint   Patient presents with    Fever     Reports fever all day. Highest temp=102. Has not taken any fever reducer medication. Pt is approximately 15 weeks pregnant. Also reports cough, body aches, nasal congestion and dysuria. Denies abdominal pain. Denies vaginal bleeding.       EXTERNAL RECORDS REVIEWED  Outpatient Notes urgent care visit 125/23 for strep pharyngitis.  Patient received antibiotics and was discharged.    HPI/ROS  LIMITATION TO HISTORY   Select: : None  OUTSIDE HISTORIAN(S):  none    Evelyn Priest is a 23 y.o. female who presents with fever at home to 102.  She also notes myalgias, cough and runny nose.  She also does note dysuria.  She notes she is 15 weeks pregnant and is established with OB/GYN.  She has had her first prenatal ultrasound confirming IUP.  She has not been taking medications for her symptoms and including no Tylenol for fever today.  She is not having trouble breathing, not having significant chest pain.  Her primary concern is that she has COVID or flu and she has never been sick during her pregnancy.  She has been drinking plenty of fluids but has not felt like eating today.  She is noted some first trimester hyperemesis gravidarum that has been improving since entering second trimester no vomiting today.    PAST MEDICAL HISTORY   Denies    SURGICAL HISTORY  patient denies any surgical history    FAMILY HISTORY  Family History   Problem Relation Age of Onset    Diabetes Mother        SOCIAL HISTORY  Social History     Tobacco Use    Smoking status: Never    Smokeless tobacco: Never   Vaping Use    Vaping Use: Never used   Substance and Sexual Activity    Alcohol use: Yes    Drug use: No    Sexual activity: Not on file       CURRENT MEDICATIONS  Home Medications       Reviewed by Esther Ovalle R.N. (Registered Nurse) on 01/21/24 at 1828  Med List Status: Partial     Medication Last Dose Status   escitalopram (LEXAPRO) 5  "MG tablet  Active   ibuprofen (MOTRIN) 600 MG Tab  Active   XULANE 150-35 MCG/24HR patch  Active                    ALLERGIES  No Known Allergies    PHYSICAL EXAM  VITAL SIGNS: /75   Pulse (!) 101   Temp 36.9 °C (98.4 °F) (Temporal)   Resp 19   Ht 1.549 m (5' 1\")   Wt 59.6 kg (131 lb 6.3 oz)   LMP 10/02/2023   SpO2 98%   BMI 24.83 kg/m²    Constitutional: Awake and alert. No acute distress.  Head: NCAT.  HEENT: Normal Conjunctiva. PERRLA.  Neck: Grossly normal range of motion. Airway midline.  Cardiovascular: Normal heart rate, Normal rhythm.  Thorax & Lungs: No respiratory distress. Clear to Auscultation bilaterally.  Abdomen: Normal inspection. Nontender. Nondistended. FHT with bedside ultrasound demonstrate fetal movement, HR 140s.  Skin: No obvious rash.  Back: No tenderness, No CVA tenderness.   Musculoskeletal: No obvious deformity. Moves all extremities Well.  Neurologic: A&Ox3.   Psychiatric: Mood and affect are appropriate for situation.    LABS  Results for orders placed or performed during the hospital encounter of 01/21/24   URINALYSIS,CULTURE IF INDICATED    Specimen: Urine   Result Value Ref Range    Color Yellow     Character Cloudy (A)     Specific Gravity 1.025 <1.035    Ph 6.0 5.0 - 8.0    Glucose Negative Negative mg/dL    Ketones 80 (A) Negative mg/dL    Protein Negative Negative mg/dL    Bilirubin Negative Negative    Urobilinogen, Urine 1.0 Negative    Nitrite Negative Negative    Leukocyte Esterase Negative Negative    Occult Blood Negative Negative    Micro Urine Req Microscopic    URINE MICROSCOPIC (W/UA)   Result Value Ref Range    WBC 5-10 (A) /hpf    RBC 2-5 (A) /hpf    Bacteria Moderate (A) None /hpf    Epithelial Cells Few /hpf    Hyaline Cast 6-10 (A) /lpf   POC CoV-2, FLU A/B, RSV by PCR   Result Value Ref Range    POC Influenza A RNA, PCR Negative Negative    POC Influenza B RNA, PCR Negative Negative    POC RSV, by PCR Negative Negative    POC SARS-CoV-2, PCR " NotDetected      COURSE & MEDICAL DECISION MAKING    ED Observation Status? No; Patient does not meet criteria for ED Observation.     INITIAL ASSESSMENT, COURSE AND PLAN  Care Narrative:   This is a 23-year-old female who is G3, P2 and 15 weeks pregnant who presents for fever, runny nose, cough of few days onset.  Afebrile, mild tachycardia, reassuring blood pressure.  On exam well-appearing, clear lungs, no respiratory distress, she does sound congested, soft nontender abdomen.  Bedside ultrasound with fetal heart tones of 140s.  Viral swab is negative  Urine does show evidence of a urinary tract infection.  We will treat her for simple UTI in pregnancy.  She is feeling improved after liter of IV fluid and Tylenol.  Discussed Tylenol at home for symptoms.  Follow-up with her OB/GYN and she has an appointment Wednesday.  Take antibiotics as prescribed for UTI.    HYDRATION: Based on the patient's presentation of Tachycardia the patient was given IV fluids. IV Hydration was used because oral hydration was not as rapid as required. Upon recheck following hydration, the patient was improved.      ADDITIONAL PROBLEM LIST    URI  Second trimester pregnancy  UTI  Tachycardia    DISPOSITION AND DISCUSSIONS  I have discussed management of the patient with the following physicians and NY's:  None    Discussion of management with other QHP or appropriate source(s): None     Escalation of care considered, and ultimately not performed:acute inpatient care management, however at this time, the patient is most appropriate for outpatient management    Barriers to care at this time, including but not limited to: Patient does not have established PCP.     Decision tools and prescription drugs considered including, but not limited to: Antibiotics for UTI in pregnancy .    FINAL DIAGNOSIS  1. Viral illness    2. Second trimester pregnancy    3. Acute cystitis without hematuria           Electronically signed by: Zak Diaz,  JENNIFER, 1/21/2024 7:57 PM

## 2024-01-22 NOTE — ED NOTES
Bedside report to Jesse RN.  POC discussed with patient. Call light within reach, all needs addressed at this time.         Fall risk interventions in place: Not Applicable (all applicable per [place] Fall risk assessment)   Continuous monitoring: Cardiac Leads, Pulse Ox, or Blood Pressure  IVF/IV medications: Not Applicable   Oxygen: Room Air  Bedside sitter: Not Applicable   Isolation: Not Applicable

## 2024-01-22 NOTE — ED TRIAGE NOTES
Chief Complaint   Patient presents with    Fever     Reports fever all day. Highest temp=102. Has not taken any fever reducer medication. Pt is approximately 15 weeks pregnant. Also reports cough, body aches, nasal congestion and dysuria. Denies abdominal pain. Denies vaginal bleeding.     Educated on triage process. Instructed to notify staff for any worsening symptoms. Pt arrived wearing mask in triage.  Vitals:    01/21/24 1816   BP: 110/61   Pulse: (!) 125   Resp: 16   Temp: 37.3 °C (99.2 °F)   SpO2: 95%

## 2024-06-27 ENCOUNTER — ANESTHESIA (OUTPATIENT)
Dept: ANESTHESIOLOGY | Facility: MEDICAL CENTER | Age: 24
End: 2024-06-27
Payer: COMMERCIAL

## 2024-06-27 ENCOUNTER — HOSPITAL ENCOUNTER (INPATIENT)
Facility: MEDICAL CENTER | Age: 24
LOS: 2 days | End: 2024-06-29
Attending: OBSTETRICS & GYNECOLOGY | Admitting: OBSTETRICS & GYNECOLOGY
Payer: COMMERCIAL

## 2024-06-27 ENCOUNTER — ANESTHESIA EVENT (OUTPATIENT)
Dept: ANESTHESIOLOGY | Facility: MEDICAL CENTER | Age: 24
End: 2024-06-27
Payer: COMMERCIAL

## 2024-06-27 LAB
ERYTHROCYTE [DISTWIDTH] IN BLOOD BY AUTOMATED COUNT: 40 FL (ref 35.9–50)
HCT VFR BLD AUTO: 35.4 % (ref 37–47)
HGB BLD-MCNC: 11.9 G/DL (ref 12–16)
HOLDING TUBE BB 8507: NORMAL
MCH RBC QN AUTO: 27.5 PG (ref 27–33)
MCHC RBC AUTO-ENTMCNC: 33.6 G/DL (ref 32.2–35.5)
MCV RBC AUTO: 81.9 FL (ref 81.4–97.8)
PLATELET # BLD AUTO: 249 K/UL (ref 164–446)
PMV BLD AUTO: 8.6 FL (ref 9–12.9)
RBC # BLD AUTO: 4.32 M/UL (ref 4.2–5.4)
T PALLIDUM AB SER QL IA: NORMAL
WBC # BLD AUTO: 11.5 K/UL (ref 4.8–10.8)

## 2024-06-27 PROCEDURE — 86780 TREPONEMA PALLIDUM: CPT

## 2024-06-27 PROCEDURE — 36415 COLL VENOUS BLD VENIPUNCTURE: CPT

## 2024-06-27 PROCEDURE — 700102 HCHG RX REV CODE 250 W/ 637 OVERRIDE(OP): Performed by: OBSTETRICS & GYNECOLOGY

## 2024-06-27 PROCEDURE — 302449 STATCHG TRIAGE ONLY (STATISTIC)

## 2024-06-27 PROCEDURE — 700105 HCHG RX REV CODE 258: Performed by: OBSTETRICS & GYNECOLOGY

## 2024-06-27 PROCEDURE — 303615 HCHG EPIDURAL/SPINAL ANESTHESIA FOR LABOR

## 2024-06-27 PROCEDURE — 700101 HCHG RX REV CODE 250: Performed by: ANESTHESIOLOGY

## 2024-06-27 PROCEDURE — 770002 HCHG ROOM/CARE - OB PRIVATE (112)

## 2024-06-27 PROCEDURE — 304965 HCHG RECOVERY SERVICES

## 2024-06-27 PROCEDURE — 59409 OBSTETRICAL CARE: CPT

## 2024-06-27 PROCEDURE — 85027 COMPLETE CBC AUTOMATED: CPT

## 2024-06-27 PROCEDURE — 700111 HCHG RX REV CODE 636 W/ 250 OVERRIDE (IP): Performed by: OBSTETRICS & GYNECOLOGY

## 2024-06-27 PROCEDURE — 10907ZC DRAINAGE OF AMNIOTIC FLUID, THERAPEUTIC FROM PRODUCTS OF CONCEPTION, VIA NATURAL OR ARTIFICIAL OPENING: ICD-10-PCS | Performed by: OBSTETRICS & GYNECOLOGY

## 2024-06-27 PROCEDURE — A9270 NON-COVERED ITEM OR SERVICE: HCPCS | Performed by: OBSTETRICS & GYNECOLOGY

## 2024-06-27 PROCEDURE — 700105 HCHG RX REV CODE 258: Performed by: ANESTHESIOLOGY

## 2024-06-27 PROCEDURE — 700111 HCHG RX REV CODE 636 W/ 250 OVERRIDE (IP): Performed by: ANESTHESIOLOGY

## 2024-06-27 RX ORDER — OXYTOCIN 10 [USP'U]/ML
10 INJECTION, SOLUTION INTRAMUSCULAR; INTRAVENOUS
Status: DISCONTINUED | OUTPATIENT
Start: 2024-06-27 | End: 2024-06-27 | Stop reason: HOSPADM

## 2024-06-27 RX ORDER — ONDANSETRON 4 MG/1
4 TABLET, ORALLY DISINTEGRATING ORAL EVERY 6 HOURS PRN
Status: DISCONTINUED | OUTPATIENT
Start: 2024-06-27 | End: 2024-06-27 | Stop reason: HOSPADM

## 2024-06-27 RX ORDER — MISOPROSTOL 200 UG/1
600 TABLET ORAL
Status: DISCONTINUED | OUTPATIENT
Start: 2024-06-27 | End: 2024-06-29 | Stop reason: HOSPADM

## 2024-06-27 RX ORDER — SODIUM CHLORIDE, SODIUM LACTATE, POTASSIUM CHLORIDE, AND CALCIUM CHLORIDE .6; .31; .03; .02 G/100ML; G/100ML; G/100ML; G/100ML
1000 INJECTION, SOLUTION INTRAVENOUS
Status: COMPLETED | OUTPATIENT
Start: 2024-06-27 | End: 2024-06-27

## 2024-06-27 RX ORDER — ACETAMINOPHEN 500 MG
1000 TABLET ORAL EVERY 6 HOURS PRN
Status: DISCONTINUED | OUTPATIENT
Start: 2024-06-27 | End: 2024-06-29 | Stop reason: HOSPADM

## 2024-06-27 RX ORDER — IBUPROFEN 800 MG/1
800 TABLET ORAL
Status: COMPLETED | OUTPATIENT
Start: 2024-06-27 | End: 2024-06-27

## 2024-06-27 RX ORDER — ALUMINA, MAGNESIA, AND SIMETHICONE 2400; 2400; 240 MG/30ML; MG/30ML; MG/30ML
30 SUSPENSION ORAL EVERY 6 HOURS PRN
Status: DISCONTINUED | OUTPATIENT
Start: 2024-06-27 | End: 2024-06-27 | Stop reason: HOSPADM

## 2024-06-27 RX ORDER — DOCUSATE SODIUM 100 MG/1
100 CAPSULE, LIQUID FILLED ORAL 2 TIMES DAILY PRN
Status: DISCONTINUED | OUTPATIENT
Start: 2024-06-27 | End: 2024-06-29 | Stop reason: HOSPADM

## 2024-06-27 RX ORDER — ACETAMINOPHEN 500 MG
1000 TABLET ORAL
Status: DISCONTINUED | OUTPATIENT
Start: 2024-06-27 | End: 2024-06-27 | Stop reason: HOSPADM

## 2024-06-27 RX ORDER — SODIUM CHLORIDE, SODIUM LACTATE, POTASSIUM CHLORIDE, CALCIUM CHLORIDE 600; 310; 30; 20 MG/100ML; MG/100ML; MG/100ML; MG/100ML
INJECTION, SOLUTION INTRAVENOUS CONTINUOUS
Status: DISCONTINUED | OUTPATIENT
Start: 2024-06-27 | End: 2024-06-27

## 2024-06-27 RX ORDER — VITAMIN A ACETATE, BETA CAROTENE, ASCORBIC ACID, CHOLECALCIFEROL, .ALPHA.-TOCOPHEROL ACETATE, DL-, THIAMINE MONONITRATE, RIBOFLAVIN, NIACINAMIDE, PYRIDOXINE HYDROCHLORIDE, FOLIC ACID, CYANOCOBALAMIN, CALCIUM CARBONATE, FERROUS FUMARATE, ZINC OXIDE, CUPRIC OXIDE 3080; 12; 120; 400; 1; 1.84; 3; 20; 22; 920; 25; 200; 27; 10; 2 [IU]/1; UG/1; MG/1; [IU]/1; MG/1; MG/1; MG/1; MG/1; MG/1; [IU]/1; MG/1; MG/1; MG/1; MG/1; MG/1
1 TABLET, FILM COATED ORAL
Status: DISCONTINUED | OUTPATIENT
Start: 2024-06-28 | End: 2024-06-29 | Stop reason: HOSPADM

## 2024-06-27 RX ORDER — ONDANSETRON 2 MG/ML
4 INJECTION INTRAMUSCULAR; INTRAVENOUS EVERY 6 HOURS PRN
Status: DISCONTINUED | OUTPATIENT
Start: 2024-06-27 | End: 2024-06-27 | Stop reason: HOSPADM

## 2024-06-27 RX ORDER — CARBOPROST TROMETHAMINE 250 UG/ML
250 INJECTION, SOLUTION INTRAMUSCULAR
Status: DISCONTINUED | OUTPATIENT
Start: 2024-06-27 | End: 2024-06-27 | Stop reason: HOSPADM

## 2024-06-27 RX ORDER — METHYLERGONOVINE MALEATE 0.2 MG/ML
0.2 INJECTION INTRAVENOUS
Status: DISCONTINUED | OUTPATIENT
Start: 2024-06-27 | End: 2024-06-27 | Stop reason: HOSPADM

## 2024-06-27 RX ORDER — ROPIVACAINE HYDROCHLORIDE 2 MG/ML
INJECTION, SOLUTION EPIDURAL; INFILTRATION; PERINEURAL
Status: DISCONTINUED
Start: 2024-06-27 | End: 2024-06-27

## 2024-06-27 RX ORDER — EPHEDRINE SULFATE 50 MG/ML
5 INJECTION, SOLUTION INTRAVENOUS
Status: DISCONTINUED | OUTPATIENT
Start: 2024-06-27 | End: 2024-06-27 | Stop reason: HOSPADM

## 2024-06-27 RX ORDER — METHYLERGONOVINE MALEATE 0.2 MG/ML
0.2 INJECTION INTRAVENOUS
Status: DISCONTINUED | OUTPATIENT
Start: 2024-06-27 | End: 2024-06-29 | Stop reason: HOSPADM

## 2024-06-27 RX ORDER — ROPIVACAINE HYDROCHLORIDE 2 MG/ML
INJECTION, SOLUTION EPIDURAL; INFILTRATION; PERINEURAL CONTINUOUS
Status: DISCONTINUED | OUTPATIENT
Start: 2024-06-27 | End: 2024-06-27

## 2024-06-27 RX ORDER — LIDOCAINE HYDROCHLORIDE 10 MG/ML
20 INJECTION, SOLUTION INFILTRATION; PERINEURAL
Status: DISCONTINUED | OUTPATIENT
Start: 2024-06-27 | End: 2024-06-27 | Stop reason: HOSPADM

## 2024-06-27 RX ORDER — TERBUTALINE SULFATE 1 MG/ML
0.25 INJECTION, SOLUTION SUBCUTANEOUS
Status: DISCONTINUED | OUTPATIENT
Start: 2024-06-27 | End: 2024-06-27 | Stop reason: HOSPADM

## 2024-06-27 RX ORDER — SIMETHICONE 125 MG
125 TABLET,CHEWABLE ORAL 4 TIMES DAILY PRN
Status: DISCONTINUED | OUTPATIENT
Start: 2024-06-27 | End: 2024-06-29 | Stop reason: HOSPADM

## 2024-06-27 RX ORDER — CALCIUM CARBONATE 500 MG/1
1000 TABLET, CHEWABLE ORAL EVERY 6 HOURS PRN
Status: DISCONTINUED | OUTPATIENT
Start: 2024-06-27 | End: 2024-06-29 | Stop reason: HOSPADM

## 2024-06-27 RX ORDER — LIDOCAINE HYDROCHLORIDE AND EPINEPHRINE 15; 5 MG/ML; UG/ML
INJECTION, SOLUTION EPIDURAL
Status: COMPLETED | OUTPATIENT
Start: 2024-06-27 | End: 2024-06-27

## 2024-06-27 RX ORDER — SODIUM CHLORIDE, SODIUM LACTATE, POTASSIUM CHLORIDE, AND CALCIUM CHLORIDE .6; .31; .03; .02 G/100ML; G/100ML; G/100ML; G/100ML
250 INJECTION, SOLUTION INTRAVENOUS PRN
Status: DISCONTINUED | OUTPATIENT
Start: 2024-06-27 | End: 2024-06-27 | Stop reason: HOSPADM

## 2024-06-27 RX ORDER — SODIUM CHLORIDE, SODIUM LACTATE, POTASSIUM CHLORIDE, CALCIUM CHLORIDE 600; 310; 30; 20 MG/100ML; MG/100ML; MG/100ML; MG/100ML
INJECTION, SOLUTION INTRAVENOUS PRN
Status: DISCONTINUED | OUTPATIENT
Start: 2024-06-27 | End: 2024-06-29 | Stop reason: HOSPADM

## 2024-06-27 RX ORDER — CARBOPROST TROMETHAMINE 250 UG/ML
250 INJECTION, SOLUTION INTRAMUSCULAR
Status: DISCONTINUED | OUTPATIENT
Start: 2024-06-27 | End: 2024-06-29 | Stop reason: HOSPADM

## 2024-06-27 RX ORDER — IBUPROFEN 800 MG/1
800 TABLET ORAL EVERY 8 HOURS PRN
Status: DISCONTINUED | OUTPATIENT
Start: 2024-06-28 | End: 2024-06-29 | Stop reason: HOSPADM

## 2024-06-27 RX ORDER — MISOPROSTOL 200 UG/1
800 TABLET ORAL
Status: COMPLETED | OUTPATIENT
Start: 2024-06-27 | End: 2024-06-27

## 2024-06-27 RX ADMIN — SODIUM CHLORIDE, POTASSIUM CHLORIDE, SODIUM LACTATE AND CALCIUM CHLORIDE: 600; 310; 30; 20 INJECTION, SOLUTION INTRAVENOUS at 11:25

## 2024-06-27 RX ADMIN — SODIUM CHLORIDE, POTASSIUM CHLORIDE, SODIUM LACTATE AND CALCIUM CHLORIDE: 600; 310; 30; 20 INJECTION, SOLUTION INTRAVENOUS at 11:00

## 2024-06-27 RX ADMIN — OXYTOCIN 20 UNITS: 10 INJECTION INTRAVENOUS at 16:11

## 2024-06-27 RX ADMIN — LIDOCAINE HYDROCHLORIDE,EPINEPHRINE BITARTRATE 3 ML: 15; .005 INJECTION, SOLUTION EPIDURAL; INFILTRATION; INTRACAUDAL; PERINEURAL at 10:39

## 2024-06-27 RX ADMIN — OXYTOCIN 125 ML/HR: 10 INJECTION INTRAVENOUS at 18:02

## 2024-06-27 RX ADMIN — ROPIVACAINE HYDROCHLORIDE: 2 INJECTION, SOLUTION EPIDURAL; INFILTRATION; PERINEURAL at 10:52

## 2024-06-27 RX ADMIN — MISOPROSTOL 800 MCG: 200 TABLET ORAL at 16:10

## 2024-06-27 RX ADMIN — SODIUM CHLORIDE, POTASSIUM CHLORIDE, SODIUM LACTATE AND CALCIUM CHLORIDE 1000 ML: 600; 310; 30; 20 INJECTION, SOLUTION INTRAVENOUS at 10:22

## 2024-06-27 RX ADMIN — IBUPROFEN 800 MG: 800 TABLET, FILM COATED ORAL at 19:52

## 2024-06-27 RX ADMIN — LIDOCAINE HYDROCHLORIDE,EPINEPHRINE BITARTRATE 2 ML: 15; .005 INJECTION, SOLUTION EPIDURAL; INFILTRATION; INTRACAUDAL; PERINEURAL at 10:47

## 2024-06-27 ASSESSMENT — LIFESTYLE VARIABLES
TOTAL SCORE: 0
EVER FELT BAD OR GUILTY ABOUT YOUR DRINKING: NO
ALCOHOL_USE: NO
HAVE PEOPLE ANNOYED YOU BY CRITICIZING YOUR DRINKING: NO
TOTAL SCORE: 0
DOES PATIENT WANT TO STOP DRINKING: NO
CONSUMPTION TOTAL: INCOMPLETE
EVER HAD A DRINK FIRST THING IN THE MORNING TO STEADY YOUR NERVES TO GET RID OF A HANGOVER: NO
TOTAL SCORE: 0
HAVE YOU EVER FELT YOU SHOULD CUT DOWN ON YOUR DRINKING: NO
EVER_SMOKED: NEVER

## 2024-06-27 ASSESSMENT — PATIENT HEALTH QUESTIONNAIRE - PHQ9
1. LITTLE INTEREST OR PLEASURE IN DOING THINGS: NOT AT ALL
2. FEELING DOWN, DEPRESSED, IRRITABLE, OR HOPELESS: NOT AT ALL
SUM OF ALL RESPONSES TO PHQ9 QUESTIONS 1 AND 2: 0

## 2024-06-27 ASSESSMENT — FIBROSIS 4 INDEX: FIB4 SCORE: 0.46

## 2024-06-27 ASSESSMENT — PAIN DESCRIPTION - PAIN TYPE
TYPE: ACUTE PAIN
TYPE: ACUTE PAIN

## 2024-06-27 ASSESSMENT — PAIN SCALES - GENERAL: PAIN_LEVEL: 2

## 2024-06-27 NOTE — H&P
"Department of Obstetrics and Gynecology  Labor and Delivery History and Physical    Date of Admission: 2024     ID: 23 y.o.  with IUP at 37 weeks and 5 days.  CHRISTIAN 2024    Primary OB: Rodríguez Escalante M.D.     Attending OB: Selina Barnett MD    CC: Painful contractions    HPI: Evelyn Priest is a 23 y.o.  at 37 weeks and 5 days with painful regular contractions. No lOF/VB.  Labor started at 6 AM.  Since arrival she has received an epidural and is now feeling more comfortable    Pregnancy has been complicated by dilated fetal bowels with a single loop seen on anatomy.  The baby will need imaging at birth and pediatric surgery consultation on after delivery    ROS: 10 systems reviewed and negative except as noted above.    Obstetric History    - 2017, , 38w, male, 8tt20be  G2 - , , 39w, female, 7lb1oz  G3 - current pregnancy         Past Medical History  Surgical History   Anemia    None      Gynecologic History  Social History   regular menses prior to pregnancy  denies Hx of abnormal pap smears.  denies Hx of STIs Tobacco: denies  EtOH: denies  Street Drugs: denies  She works for [employer]. Partner is Darrick       Medications  Allergies   PN vitamins   Patient has no known allergies.       O: /63   Pulse 86   Temp 35.8 °C (96.5 °F) (Temporal)   Resp 18   Ht 1.549 m (5' 1\")   Wt 65.8 kg (145 lb)   LMP 10/02/2023   BMI 27.40 kg/m²     Gen: NAD, AAO  Abd: Gravid, NTTP,Cephalic by Le[place], No rebound or guarding  Ext: NTTP, no edema, 2+DPP  Pelvic: SVE 80/-2 per RN     FHT:  150s with moderate LTV. +accels. No variables. Early decles present   Netarts: CTX q4    Labs:   Recent Labs     24  1003   WBC 11.5*   RBC 4.32   HEMOGLOBIN 11.9*   HEMATOCRIT 35.4*   MCV 81.9   MCH 27.5   RDW 40.0   PLATELETCT 249   MPV 8.6*       Prenatal labs:  Blood Type: O positive  AST: negative  Rubella: NONIMMUNE   HbSAg: negative  HIV: non reactive  RPR: non " reactive   Varicella: immune   GTT: 135  GBS: negative     Genetic screening: Low risk NIPT XX  Carrier screening: Core 4 negative (including negative CF testing)   Ultrasound: Anterior placenta, no previa, Single loop of dilated bowel on US. Appeared c/w small bowel in the pelvis. Not echogenic.   Growth:   @ 37w: 7lb6oz, 69%, AC >95%     A/P: Evelyn Priest is a 23 y.o.  at 37w5d in labor  Anemia  Fetal Dilated bowel on US    -  *Admit to L&D  *IV, CBC, T&S on hold  *Labor: Anticipate   *FWB: Reassuring, reactive, Cat I FHT.  Continuous EFM.  *Pain: desires epidural  *Dilated bowel: Will need  imaging and peds surgery consult after delivery. Prior prenatal consult with Dr. Baumgarten.     Selina Barnett M.D.,  2024, 10:28 AM

## 2024-06-27 NOTE — ANESTHESIA PROCEDURE NOTES
Epidural Block    Date/Time: 6/27/2024 10:39 AM    Performed by: Motnserrat Stone M.D.  Authorized by: Montserrat Stone M.D.    Patient Location:  OB  Start Time:  6/27/2024 10:39 AM  Reason for Block: labor analgesia    patient identified, IV checked, site marked, risks and benefits discussed, surgical consent, monitors and equipment checked and pre-op evaluation    Patient Position:  Sitting  Prep: ChloraPrep, patient draped and sterile technique    Monitoring:  Blood pressure, continuous pulse oximetry and heart rate  Approach:  Midline  Location:  L4-L5  Injection Technique:  BEVERLY saline  Skin infiltration:  Lidocaine  Strength:  1%  Dose:  3ml  Needle Type:  Tuohy  Needle Gauge:  17 G  Needle Length:  3.5 in  Loss of resistance::  4  Catheter Size:  19 G  Catheter at Skin Depth:  9  Test Dose Result:  Negative

## 2024-06-27 NOTE — L&D DELIVERY NOTE
Labor and Delivery  Delivery Note     Preoperative Diagnosis:   24yo  @ 37w5d  Active labor  Fetal dilated bowel on US     Procedure:       Postoperative Diagnosis:   S/p   Fetal dilated bowel on US     Primary OB: Isaak Escalante MD  Delivering OB: Selina Barnett MD    Anesthesia: Epidural    Labor Course:   Margie is a 23-year-old  3 para 2-0-0-2 at 37 weeks and 5 days who presented for active labor.  At the time of presentation she was 4 cm dilated.  She received an epidural and underwent amniotomy to reveal clear fluid    Procedure Note:   She progressed to complete and pushed well to deliver baby girl OA over an intact perineum a loose nuchal cord was reduced.  The right anterior shoulder delivered without difficulty and baby was placed on maternal stomach where she was immediately vigorous.  Cord was allowed to pulsate for 3 minutes per parental request.  At which point it was clamped and cut.  Pitocin was initiated.  The placenta delivered easily and intact under gentle manual traction.  Initially she had a small amount of atony which resolved with Pitocin and fundal massage.  Additionally she was given 800 of Cytotec per rectum.  She had no perineal lacerations    Findings:   Baby Girl @ 1534, Apgars 8/9, Weight and name pending.    Normal placenta with 3v cord   Terminal meconium noted with delivery   Clear amniotic fluid  Loose Nuchal x 2    Medications:   Pitocin per protocol   Cytotec 800mcg MS    EBL: 300cc    Disposition: Stable to postpartum and  nursery.  The baby was evaluated by the NICU nursery RN who felt the baby was stable for  nursery.  Dr. Lizarraga's partner had previously done a prenatal consult.  Dr. Lizarraga requested a KUB be ordered for the baby.  The [facility] peds team was notified.     Selina Barnett MD

## 2024-06-27 NOTE — ANESTHESIA PREPROCEDURE EVALUATION
Date: 06/27/24  Procedure: Labor Epidural         Relevant Problems   No relevant active problems       Physical Exam    Airway   Mallampati: I  TM distance: >3 FB  Neck ROM: full       Cardiovascular - normal exam     Dental - normal exam           Pulmonary   Breath sounds clear to auscultation     Abdominal    Neurological - normal exam                   Anesthesia Plan    ASA 2       Plan - epidural   Neuraxial block will be labor analgesia                  Pertinent diagnostic labs and testing reviewed    Informed Consent:    Anesthetic plan and risks discussed with patient.

## 2024-06-27 NOTE — PROGRESS NOTES
0930: pt to labor and delivery c/o uc's every 2-3 min becoming consistently stronger. Efm and toco applied. Vss.  Sve 4/80/-2. Report to Dr. Barnett, orders to admit to labor and delivery. Pt request epidural.    1200: Dr. Working at , AROM clear.  Sve 7/90/-2.  Pt resting comfortably with epidural.     1510: rn to , pt feeling sacral pain, sve complete/+2. Call to Dr. Barnett to attend delivery.     1515: begin pushing,  Working at .    1534: del of viable girl, apgars 8/9    1539: del of placenta, spontaneous/intact    1845: pt up to br, voided. Pericare done. Report to Juan AVILA.

## 2024-06-27 NOTE — PROGRESS NOTES
"Labor Note    S: Comfortable with epidural. Consents to amniotomy    O: /64   Pulse 72   Temp 35.8 °C (96.5 °F) (Temporal)   Resp 18   Ht 1.549 m (5' 1\")   Wt 65.8 kg (145 lb)   SpO2 99%   Gen: NAD  SVE: /-1 AROM clear    [place]: q4-6  FHT: 140s with moderate LTV. +accels. No variables/decels    Labs: RH+, RI, GBS neg  Recent Labs     24  1003   WBC 11.5*   RBC 4.32   HEMOGLOBIN 11.9*   HEMATOCRIT 35.4*   MCV 81.9   MCH 27.5   RDW 40.0   PLATELETCT 249   MPV 8.6*     A/P 24yo  @ 37w5d, active labor  Anemia  Fetal dilated bowel on US   -  Labor: Anticipate   FWB: Cat I  Pain: well controlled with epidural  Peds On call Surgeon notified of arrival.    Selina Barnett MD   "

## 2024-06-27 NOTE — DISCHARGE SUMMARY
"PPD#: 1    S: doing well but tired. Pain controlled. Lochia normal    O: BP 94/52   Pulse 68   Temp 36.6 °C (97.8 °F) (Temporal)   Resp 16   Ht 1.549 m (5' 1\")   Wt 65.8 kg (145 lb)   SpO2 97%   Gen: NAD  Abdomen: Fundus firm below umbilicus. No TTP  Ext: No c/c/e    RH status: positive  GBS: negative  Rubella: nonimmune    Labs:   Recent Labs     24  1003 24  0036   WBC 11.5* 13.7*   RBC 4.32 3.97*   HEMOGLOBIN 11.9* 11.2*   HEMATOCRIT 35.4* 33.5*   MCV 81.9 84.4   MCH 27.5 28.2   RDW 40.0 41.5   PLATELETCT 249 235   MPV 8.6* 8.8*       A/P 22yo  s/p , active labor at 37w5d  Fetal dilated bowel on US   Anemia   EBL: 300cc  Complications: none apparent   -  Routine pp care  Anemia: present on admission. Asymptomatic  : KUB cleared by Dr. Lizarraga for feeding but they are watching head circumference  Home tomorrow     Selina Barnett MD   "

## 2024-06-28 LAB
ERYTHROCYTE [DISTWIDTH] IN BLOOD BY AUTOMATED COUNT: 41.5 FL (ref 35.9–50)
HCT VFR BLD AUTO: 33.5 % (ref 37–47)
HGB BLD-MCNC: 11.2 G/DL (ref 12–16)
MCH RBC QN AUTO: 28.2 PG (ref 27–33)
MCHC RBC AUTO-ENTMCNC: 33.4 G/DL (ref 32.2–35.5)
MCV RBC AUTO: 84.4 FL (ref 81.4–97.8)
PLATELET # BLD AUTO: 235 K/UL (ref 164–446)
PMV BLD AUTO: 8.8 FL (ref 9–12.9)
RBC # BLD AUTO: 3.97 M/UL (ref 4.2–5.4)
WBC # BLD AUTO: 13.7 K/UL (ref 4.8–10.8)

## 2024-06-28 PROCEDURE — 85027 COMPLETE CBC AUTOMATED: CPT

## 2024-06-28 PROCEDURE — 36415 COLL VENOUS BLD VENIPUNCTURE: CPT

## 2024-06-28 PROCEDURE — A9270 NON-COVERED ITEM OR SERVICE: HCPCS | Performed by: OBSTETRICS & GYNECOLOGY

## 2024-06-28 PROCEDURE — 770002 HCHG ROOM/CARE - OB PRIVATE (112)

## 2024-06-28 PROCEDURE — 700102 HCHG RX REV CODE 250 W/ 637 OVERRIDE(OP): Performed by: OBSTETRICS & GYNECOLOGY

## 2024-06-28 RX ADMIN — ACETAMINOPHEN 1000 MG: 500 TABLET ORAL at 07:58

## 2024-06-28 RX ADMIN — ACETAMINOPHEN 1000 MG: 500 TABLET ORAL at 18:09

## 2024-06-28 RX ADMIN — PRENATAL WITH FERROUS FUM AND FOLIC ACID 1 TABLET: 3080; 920; 120; 400; 22; 1.84; 3; 20; 10; 1; 12; 200; 27; 25; 2 TABLET ORAL at 07:54

## 2024-06-28 ASSESSMENT — EDINBURGH POSTNATAL DEPRESSION SCALE (EPDS)
THE THOUGHT OF HARMING MYSELF HAS OCCURRED TO ME: HARDLY EVER
I HAVE BEEN ABLE TO LAUGH AND SEE THE FUNNY SIDE OF THINGS: AS MUCH AS I ALWAYS COULD
I HAVE BLAMED MYSELF UNNECESSARILY WHEN THINGS WENT WRONG: YES, SOME OF THE TIME
I HAVE FELT SCARED OR PANICKY FOR NO GOOD REASON: NO, NOT MUCH
I HAVE BEEN SO UNHAPPY THAT I HAVE HAD DIFFICULTY SLEEPING: NOT VERY OFTEN
I HAVE BEEN ANXIOUS OR WORRIED FOR NO GOOD REASON: YES, SOMETIMES
THINGS HAVE BEEN GETTING ON TOP OF ME: YES, SOMETIMES I HAVEN'T BEEN COPING AS WELL AS USUAL
I HAVE LOOKED FORWARD WITH ENJOYMENT TO THINGS: RATHER LESS THAN I USED TO
I HAVE BEEN SO UNHAPPY THAT I HAVE BEEN CRYING: ONLY OCCASIONALLY
I HAVE FELT SAD OR MISERABLE: YES, QUITE OFTEN

## 2024-06-28 ASSESSMENT — PAIN DESCRIPTION - PAIN TYPE
TYPE: ACUTE PAIN

## 2024-06-28 ASSESSMENT — PATIENT HEALTH QUESTIONNAIRE - PHQ9
1. LITTLE INTEREST OR PLEASURE IN DOING THINGS: NOT AT ALL
SUM OF ALL RESPONSES TO PHQ9 QUESTIONS 1 AND 2: 0
2. FEELING DOWN, DEPRESSED, IRRITABLE, OR HOPELESS: NOT AT ALL

## 2024-06-28 NOTE — ANESTHESIA POSTPROCEDURE EVALUATION
Patient: Evelyn Priest    Procedure Summary       Date: 06/27/24 Room / Location:     Anesthesia Start: 1029 Anesthesia Stop: 1534    Procedure: Labor Epidural Diagnosis:     Scheduled Providers:  Responsible Provider: Montserrat Stone M.D.    Anesthesia Type: epidural ASA Status: 2            Final Anesthesia Type: epidural  Last vitals  BP   Blood Pressure: 125/86    Temp   36 °C (96.8 °F)    Pulse   (!) 164   Resp   18    SpO2   99 %      Anesthesia Post Evaluation    Patient location during evaluation: bedside  Patient participation: complete - patient participated  Level of consciousness: awake and alert  Pain score: 2    Airway patency: patent  Anesthetic complications: no  Cardiovascular status: adequate  Respiratory status: acceptable  Hydration status: acceptable    PONV: none          No notable events documented.     Nurse Pain Score: 2 (NPRS)

## 2024-06-28 NOTE — PROGRESS NOTES
0700-- Received report from Nasima DÍAZ RN, Infant at bedside in open crib no signs of distress.  Pt resting in bed. Discussed pain management for the day.  No further needs at the time.  Call light within reach, bed locked and in lowest position.  Rounding in place.    0745-- Assessment completed, VSS, Pt given PRN pain medication at this time.  Discussed plan of care for the day that pt is comfortable with.  All questions answered at this time.

## 2024-06-28 NOTE — PROGRESS NOTES
1855: BS report received from MARGARITA Berumen. Pt up to bathroom with RN's for successful postpartum void. Perineal bottle provided, ambulated with assistance to bed. Fundal rub firm @ U, denies pain medication at this time. Awaiting room transfer to post partum unit, call light within reach, Vss.     1952: Pt given Ibuprofen for pain, ambulated to bathroom with RN present.     2000: Pt transferred to postpartum unit via wheelchair in stable condition by this RN. FOB at side, infant in arms. BS report given to MARGARITA Del Real. Bands verified, care relinquished.

## 2024-06-28 NOTE — CARE PLAN
The patient is Stable - Low risk of patient condition declining or worsening    Shift Goals  Clinical Goals: VSS, lochia wdl, pain control, rest    Progress made toward(s) clinical / shift goals:    Problem: Knowledge Deficit - Postpartum  Goal: Patient will verbalize and demonstrate understanding of self and infant care  Description: Target End Date:  1-3 days or as soon as patient condition allows    Document in Patient Education    1.  Assess patient and knowledge of self and infant care  2.  Educate patient verbally, by demonstration and written material  Outcome: Progressing     Problem: Psychosocial - Postpartum  Goal: Patient will verbalize and demonstrate effective bonding and parenting behavior  Description: Target End Date:  1 to 4 days    1.  Assess patient for anxiety or apprehension regarding parenting role  2.  Provide emotional support and encouragement to patient/family/caregiver  Outcome: Progressing     Problem: Altered Physiologic Condition  Goal: Patient physiologically stable as evidenced by normal lochia, palpable uterine involution and vitals within normal limits  Description: Target End Date:  1 to 4 days    Document on Assessment flowsheet    1.  Perform physical assessment and obtain vitals per intrapartum/postpartum standards of care  2.  Follow epidural/spinal narcotic protocol if patient has received a long acting narcotic  3.  Massage fundus as necessary to prevent excessive lochia  4.  Administer pitocin, methergine, cytotec or hemabate as ordered  Outcome: Progressing

## 2024-06-28 NOTE — ANESTHESIA TIME REPORT
Anesthesia Start and Stop Event Times       Date Time Event    6/27/2024 1028 Ready for Procedure     1029 Anesthesia Start     1534 Anesthesia Stop          Responsible Staff  06/27/24      Name Role Begin End    Montserrat Stone M.D. Anesth 1029 1534          Overtime Reason:  no overtime (within assigned shift)    Comments:

## 2024-06-28 NOTE — PROGRESS NOTES
Patient transferred from labor and delivery in wheelchair with infant in arms and FOB accompanying with belongings. Two RN verification of infant and parent armbands. Report received from EMILIANO Hines RN. Patient oriented to unit and POC including, EPDS, BC, I&O chart, feeding frequencies, safe sleeping practices, and emergency cord use. Assessment completed, fundus firm and palpable, lochia scant. Patient has already voided and is doing so without problems. Pain management discussed. Pitocin infusing at 125 ml/hr. IV patent, no s/s of infiltration at insertion site. Patient encouraged to call with needs.

## 2024-06-29 ENCOUNTER — PHARMACY VISIT (OUTPATIENT)
Dept: PHARMACY | Facility: MEDICAL CENTER | Age: 24
End: 2024-06-29
Payer: COMMERCIAL

## 2024-06-29 VITALS
RESPIRATION RATE: 18 BRPM | DIASTOLIC BLOOD PRESSURE: 61 MMHG | BODY MASS INDEX: 27.38 KG/M2 | SYSTOLIC BLOOD PRESSURE: 102 MMHG | HEART RATE: 60 BPM | WEIGHT: 145 LBS | OXYGEN SATURATION: 97 % | TEMPERATURE: 97.4 F | HEIGHT: 61 IN

## 2024-06-29 PROCEDURE — 700102 HCHG RX REV CODE 250 W/ 637 OVERRIDE(OP): Performed by: OBSTETRICS & GYNECOLOGY

## 2024-06-29 PROCEDURE — RXMED WILLOW AMBULATORY MEDICATION CHARGE: Performed by: OBSTETRICS & GYNECOLOGY

## 2024-06-29 PROCEDURE — A9270 NON-COVERED ITEM OR SERVICE: HCPCS | Performed by: OBSTETRICS & GYNECOLOGY

## 2024-06-29 RX ORDER — IBUPROFEN 800 MG/1
800 TABLET ORAL EVERY 8 HOURS PRN
Qty: 30 TABLET | Refills: 0 | Status: SHIPPED | OUTPATIENT
Start: 2024-06-29

## 2024-06-29 RX ADMIN — IBUPROFEN 800 MG: 800 TABLET, FILM COATED ORAL at 08:21

## 2024-06-29 RX ADMIN — PRENATAL WITH FERROUS FUM AND FOLIC ACID 1 TABLET: 3080; 920; 120; 400; 22; 1.84; 3; 20; 10; 1; 12; 200; 27; 25; 2 TABLET ORAL at 08:14

## 2024-06-29 ASSESSMENT — PAIN DESCRIPTION - PAIN TYPE
TYPE: ACUTE PAIN

## 2024-06-29 NOTE — DISCHARGE INSTRUCTIONS

## 2024-06-29 NOTE — DISCHARGE PLANNING
Discharge Planning Assessment Post Partum    Reason for Referral: MOB scored 13 on the EPDS screen   Address: [address] [place] [address] [place] [address]  Phone: [phone]  Type of Living Situation: stable housing   Mom Diagnosis: Pregnancy, vaginal delivery   Baby Diagnosis: [place]-37.5 weeks  Primary Language: English     Name of Baby: Natalia Doty (: 24)  Father of the Baby: Darrick Doty   Involved in baby’s care? Yes  Contact Information: [phone]    Prenatal Care: Yes-Dr. Escalante   Mom's PCP: No PCP listed   PCP for new baby: MOB takes other children to Family Practice MD (could not remember the name)    Support System: FOB  Coping/Bonding between mother & baby: Yes  Source of Feeding: breast feeding   Supplies for Infant: prepared for infant     Mom's Insurance: [employer]   Baby Covered on Insurance:Yes  Mother Employed/School: [employer]   Other children in the home/names & ages: two children-ages 6 and 5 years     Financial Hardship/Income: No   Mom's Mental status: alert and oriented   Services used prior to admit: None     CPS History: No  Psychiatric History: MOB scored 13 on the EPDS screen.  Discussed with mother and provided PPD resources   Domestic Violence History: No  Drug/ETOH History: No    Resources Provided: post partum support and counseling resources, children and family resource list, and diaper bank assistance resources   Referrals Made: diaper bank referrals provided      Clearance for Discharge: Infant is cleared to discharge home with parents once medically cleared

## 2024-06-29 NOTE — CARE PLAN
Problem: Infection - Postpartum  Goal: Postpartum patient will be free of signs and symptoms of infection  Outcome: Progressing   The patient is Stable - Low risk of patient condition declining or worsening    Shift Goals  Clinical Goals: firm fundus, light lochia  Patient Goals: breastfeed Q2-3 hours  Family Goals: rest, bonding    Progress made toward(s) clinical / shift goals:  Pt vitals are stable and within defined limits. No evidence of infection at this time.

## 2024-06-29 NOTE — LACTATION NOTE
Evelyn reports that she is breastfeeding her  Diana without difficulty or discomfort. Resources for out-patient support discussed.She accepted the parent handout for community based lactation support information.She had no questions at this time.

## 2024-06-29 NOTE — PROGRESS NOTES
1900 Obtained report from Shavonne THOMAS  2010 Pt assessment completed. No abnormal findings noted. All pt needs and questions addressed at this time.

## 2024-06-29 NOTE — PROGRESS NOTES
0815 - Received report from Soraya AVILA at change of shift. Assumed care. Assessment completed. Fundus firm, lochia scant. Plan of care reviewed. Pt will call if med intervention needed. Educated parents to offer feedings on cue, at minimum of Q3 hours and to update I&O chart. Educated parents on safe sleep and importance of keeping infant dressed and swaddled. Parents verbalized understanding. Encouraged parents to call for assistance when needed. Call light within reach. All questions and concerns answered at this time.     1220 - Discharge paperwork for pt and infant discussed with parents at bedside. All questions answered. Follow up appointment to be made by patient. NBS #2 dates given to parents. Parents verbalize understanding. Paperwork signed and dated at this time.     1233 - Infant discharged in car seat with parents. Infant placed in car seat by parents and checked by RN. Bands verified. Cuddles removed. Patient escorted off unit in wheelchair accompanied with Sonali FERNANDEZ.

## 2024-06-29 NOTE — DISCHARGE SUMMARY
Discharge Summary:     Date of Admission: 2024  Date of Discharge: 24      Admitting diagnosis:    24yo  @ 37w5d  Active labor  Fetal dilated bowel on US        Discharge Diagnosis:   S/p   Fetal dilated bowel on US     Procedure:       OB History    Para Term  AB Living   3 3 3     3   SAB IAB Ectopic Molar Multiple Live Births           0 3      # Outcome Date GA Lbr Ramon/2nd Weight Sex Delivery Anes PTL Lv   3 Term 24 37w5d 09:15 / 00:19 3.38 kg (7 lb 7.2 oz) F Vag-Spont EPI N PRAVIN   2 Term 19 39w4d 04:55 / 00:19 3.23 kg (7 lb 1.9 oz) F Vag-Spont EPI N PRAVIN   1 Term 08/10/17   3.118 kg (6 lb 14 oz) M  EPI  PRAVIN     History reviewed. No pertinent surgical history.  Patient has no known allergies.    Hospital Course:   Pt is a 23 y.o. now  who presented with active labor. At the time of presentation she was 4 cm dilated. She received an epidural and underwent amniotomy to reveal clear fluid. She progressed to complete and had a normal vaginal delivery.     She was transitioned to post partum and her pp course has been uneventful. BAby has been evaluated by the peds care team.     On day of discharge pt was ambulating, voiding spontaneously, tolerating PO, with adequate pain control, and desiring to go home.    Physical Exam:  Temp:  [36.5 °C (97.7 °F)-37.2 °C (98.9 °F)] 36.8 °C (98.2 °F)  Pulse:  [68-73] 73  Resp:  [16-17] 16  BP: ()/(52-70) 104/61  SpO2:  [94 %-97 %] 95 %  Gen: AAO, NAD  Resp: unlabored   Abd: soft, NT, ND, fundus firm below umiblicus     Ext: NT, no edema    Current Facility-Administered Medications   Medication Dose    lactated ringers infusion      docusate sodium (Colace) capsule 100 mg  100 mg    ibuprofen (Motrin) tablet 800 mg  800 mg    acetaminophen (Tylenol) tablet 1,000 mg  1,000 mg    measles, mumps and rubella vaccine (Mmr) injection 0.5 mL  0.5 mL    PRN oxytocin (PITOCIN) (20 Units/1000 mL) PRN for excessive uterine  bleeding - See Admin Instr  125-999 mL/hr    miSOPROStol (Cytotec) tablet 600 mcg  600 mcg    methylergonovine (Methergine) injection 0.2 mg  0.2 mg    carboPROST (Hemabate) injection 250 mcg  250 mcg    prenatal plus vitamin (Stuartnatal 1+1) 27-1 MG tablet 1 Tablet  1 Tablet    simethicone (Mylicon) chewable tablet 125 mg  125 mg    calcium carbonate (Tums) chewable tab 1,000 mg  1,000 mg       Recent Labs     06/27/24  1003 06/28/24  0036   WBC 11.5* 13.7*   RBC 4.32 3.97*   HEMOGLOBIN 11.9* 11.2*   HEMATOCRIT 35.4* 33.5*   MCV 81.9 84.4   MCH 27.5 28.2   MCHC 33.6 33.4   RDW 40.0 41.5   PLATELETCT 249 235   MPV 8.6* 8.8*         Activity/ Discharge Instructions::   Discharge to home  Pelvic Rest x 6 weeks  No heavy lifting x4 weeks  Call or come to ED for: heavy vaginal bleeding, fever >100.4, severe abdominal pain, severe headache, chest pain, shortness of breath,  N/V, abnormal vaginal discharge, or other concerns.       Follow up:   5-6wks for PP visit.     Discharge Meds:      Medication List        ASK your doctor about these medications        Instructions   escitalopram 5 MG tablet  Commonly known as: Lexapro   TAKE 1 TABLET 1-2 WEEKS BEFORE MENSES ONSET AND CONTINUE THROUGH MENSES EACH MONTH     ibuprofen 600 MG Tabs  Commonly known as: Motrin   Take 1 Tab by mouth every 6 hours as needed.  Dose: 600 mg     ondansetron 4 MG Tbdp  Commonly known as: Zofran ODT   Take 1 Tablet by mouth every 6 hours as needed for Nausea/Vomiting.  Dose: 4 mg     Xulane 150-35 MCG/24HR patch  Generic drug: norelgestromin-ethinyl estradiol   PLEASE SEE ATTACHED FOR DETAILED DIRECTIONS                 Natalia Becerra MD

## 2024-09-21 ENCOUNTER — OFFICE VISIT (OUTPATIENT)
Dept: URGENT CARE | Facility: PHYSICIAN GROUP | Age: 24
End: 2024-09-21
Payer: COMMERCIAL

## 2024-09-21 VITALS
HEART RATE: 99 BPM | OXYGEN SATURATION: 99 % | TEMPERATURE: 98.3 F | BODY MASS INDEX: 24.68 KG/M2 | DIASTOLIC BLOOD PRESSURE: 62 MMHG | RESPIRATION RATE: 16 BRPM | HEIGHT: 61 IN | SYSTOLIC BLOOD PRESSURE: 106 MMHG | WEIGHT: 130.73 LBS

## 2024-09-21 DIAGNOSIS — J02.0 PHARYNGITIS DUE TO STREPTOCOCCUS SPECIES: ICD-10-CM

## 2024-09-21 LAB — S PYO DNA SPEC NAA+PROBE: DETECTED

## 2024-09-21 PROCEDURE — 3078F DIAST BP <80 MM HG: CPT | Performed by: FAMILY MEDICINE

## 2024-09-21 PROCEDURE — 99213 OFFICE O/P EST LOW 20 MIN: CPT | Performed by: FAMILY MEDICINE

## 2024-09-21 PROCEDURE — 3074F SYST BP LT 130 MM HG: CPT | Performed by: FAMILY MEDICINE

## 2024-09-21 PROCEDURE — 87651 STREP A DNA AMP PROBE: CPT | Performed by: FAMILY MEDICINE

## 2024-09-21 RX ORDER — AMOXICILLIN 500 MG/1
500 CAPSULE ORAL 2 TIMES DAILY
Qty: 20 CAPSULE | Refills: 0 | Status: SHIPPED | OUTPATIENT
Start: 2024-09-21 | End: 2024-10-01

## 2024-09-21 RX ORDER — ACETAMINOPHEN 325 MG/1
650 TABLET ORAL EVERY 4 HOURS PRN
COMMUNITY

## 2024-09-21 ASSESSMENT — ENCOUNTER SYMPTOMS
SORE THROAT: 1
FEVER: 1
HEADACHES: 1

## 2024-09-21 NOTE — PROGRESS NOTES
"Subjective:     Evelyn Priest is a 24 y.o. female who presents for Pharyngitis (Sore throat, fever, headache X 3 days )    HPI  Pt presents for evaluation of an acute problem  Having fevers up to 102   Has sore throat bilaterally and worse with swallowing   No ear pain   Having headaches   No cough   No vomiting     Review of Systems   Constitutional:  Positive for fever and malaise/fatigue.   HENT:  Positive for sore throat.    Neurological:  Positive for headaches.       PMH:  has no past medical history on file.  MEDS:   Current Outpatient Medications:     acetaminophen (TYLENOL) 325 MG Tab, Take 650 mg by mouth every four hours as needed for Fever., Disp: , Rfl:     amoxicillin (AMOXIL) 500 MG Cap, Take 1 Capsule by mouth 2 times a day for 10 days., Disp: 20 Capsule, Rfl: 0    ibuprofen (MOTRIN) 800 MG Tab, Take 1 Tablet by mouth every 8 hours as needed for Moderate Pain., Disp: 30 Tablet, Rfl: 0  ALLERGIES: No Known Allergies  SURGHX: No past surgical history on file.  SOCHX:  reports that she has never smoked. She has never used smokeless tobacco. She reports current alcohol use. She reports that she does not use drugs.     Objective:   /62 (BP Location: Left arm, Patient Position: Sitting, BP Cuff Size: Adult)   Pulse 99   Temp 36.8 °C (98.3 °F) (Temporal)   Resp 16   Ht 1.549 m (5' 1\")   Wt 59.3 kg (130 lb 11.7 oz)   SpO2 99%   BMI 24.70 kg/m²     Physical Exam  Constitutional:       General: She is not in acute distress.     Appearance: She is well-developed. She is not diaphoretic.   HENT:      Head: Normocephalic and atraumatic.      Right Ear: Tympanic membrane, ear canal and external ear normal.      Left Ear: Tympanic membrane, ear canal and external ear normal.      Nose: Nose normal.      Mouth/Throat:      Mouth: Mucous membranes are moist.      Comments: Moderate erythema in the posterior pharynx, tonsils 2+, no unilateral swelling, no uvula deviation, no purulent " exudates  Pulmonary:      Effort: Pulmonary effort is normal.   Musculoskeletal:      Cervical back: Normal range of motion and neck supple. Tenderness present.   Lymphadenopathy:      Cervical: Cervical adenopathy present.   Neurological:      Mental Status: She is alert.         Assessment/Plan:   Assessment    1. Pharyngitis due to Streptococcus species  - POCT GROUP A STREP, PCR  - amoxicillin (AMOXIL) 500 MG Cap; Take 1 Capsule by mouth 2 times a day for 10 days.  Dispense: 20 Capsule; Refill: 0    Other orders  - acetaminophen (TYLENOL) 325 MG Tab; Take 650 mg by mouth every four hours as needed for Fever.    Patient with strep pharyngitis.  Treat with amoxicillin.  Follow-up in the urgent care as needed.

## 2025-01-15 ENCOUNTER — OFFICE VISIT (OUTPATIENT)
Dept: URGENT CARE | Facility: PHYSICIAN GROUP | Age: 25
End: 2025-01-15
Payer: COMMERCIAL

## 2025-01-15 VITALS
WEIGHT: 137 LBS | HEART RATE: 85 BPM | BODY MASS INDEX: 25.86 KG/M2 | DIASTOLIC BLOOD PRESSURE: 58 MMHG | HEIGHT: 61 IN | SYSTOLIC BLOOD PRESSURE: 114 MMHG | OXYGEN SATURATION: 98 % | TEMPERATURE: 98.8 F | RESPIRATION RATE: 15 BRPM

## 2025-01-15 DIAGNOSIS — R68.89 FLU-LIKE SYMPTOMS: ICD-10-CM

## 2025-01-15 LAB
FLUAV RNA SPEC QL NAA+PROBE: NEGATIVE
FLUBV RNA SPEC QL NAA+PROBE: NEGATIVE
RSV RNA SPEC QL NAA+PROBE: NEGATIVE
SARS-COV-2 RNA RESP QL NAA+PROBE: NEGATIVE

## 2025-01-15 PROCEDURE — 0241U POCT CEPHEID COV-2, FLU A/B, RSV - PCR: CPT | Performed by: NURSE PRACTITIONER

## 2025-01-15 PROCEDURE — 3078F DIAST BP <80 MM HG: CPT | Performed by: NURSE PRACTITIONER

## 2025-01-15 PROCEDURE — 3074F SYST BP LT 130 MM HG: CPT | Performed by: NURSE PRACTITIONER

## 2025-01-15 PROCEDURE — 99213 OFFICE O/P EST LOW 20 MIN: CPT | Performed by: NURSE PRACTITIONER

## 2025-01-15 NOTE — LETTER
January 15, 2025       Patient: Evelyn Priest   YOB: 2000   Date of Visit: 1/15/2025         To Whom It May Concern:    In my medical opinion, I recommend that Evelyn Priest be excused from work 1/16/2025 and 1/17/2025 due to illness.     If you have any questions or concerns, please don't hesitate to call [phone]          Sincerely,          TITUS Reed.P.R.N.  Electronically Signed

## 2025-01-16 NOTE — PROGRESS NOTES
Verbal consent was acquired by the patient to use [facility] ambient listening note generation during this visit          Chief Complaint   Patient presents with    Fever     C/o fever, headache, and body aches x1 day. Took tylenol ~3pm and has helped.           History of Present Illness  The patient is a 24-year-old female who presents for evaluation of influenza-like symptoms.    She began experiencing symptoms indicative of an illness yesterday, initially presenting as a headache and generalized body aches. She reported waking up between 3:00 and 4:00 AM with a fever of 101.7 degrees Fahrenheit. She has been resting in bed since the onset of these symptoms. She reports no exposure to sick individuals at home, noting that her children are currently healthy. She also reports no pharyngeal swelling. She attempted to manage her symptoms with two doses of Tylenol 500 mg, taken at 3:00 PM. Despite this, she felt the onset of symptoms again. She conducted a home COVID-19 test, which yielded a negative result.    MEDICATIONS  Current: Tylenol         ROS:    No severe shortness of breath   No cardiac like chest pain, as discussed   As otherwise stated in HPI    Medical/SX/ Social History:  Reviewed per chart    Pertinent Medications:    Current Outpatient Medications on File Prior to Visit   Medication Sig Dispense Refill    acetaminophen (TYLENOL) 325 MG Tab Take 650 mg by mouth every four hours as needed for Fever.      ibuprofen (MOTRIN) 800 MG Tab Take 1 Tablet by mouth every 8 hours as needed for Moderate Pain. (Patient not taking: Reported on 1/15/2025) 30 Tablet 0     No current facility-administered medications on file prior to visit.        Allergies:    Patient has no known allergies.     Problem list, medications, and allergies reviewed by myself today in Epic     Physical Exam:    Vitals:    01/15/25 1609   BP: 114/58   Pulse: 85   Resp: 15   Temp: 37.1 °C (98.8 °F)   SpO2: 98%             Physical  Exam  Vitals and nursing note reviewed.   Constitutional:       General: She is not in acute distress.     Appearance: Normal appearance. She is ill-appearing. She is not toxic-appearing.   HENT:      Head: Normocephalic and atraumatic.      Right Ear: Tympanic membrane, ear canal and external ear normal.      Left Ear: Tympanic membrane, ear canal and external ear normal.      Nose: Congestion and rhinorrhea present.      Mouth/Throat:      Mouth: Mucous membranes are moist.      Pharynx: Oropharynx is clear. No posterior oropharyngeal erythema.   Eyes:      Extraocular Movements: Extraocular movements intact.      Conjunctiva/sclera: Conjunctivae normal.      Pupils: Pupils are equal, round, and reactive to light.   Cardiovascular:      Rate and Rhythm: Normal rate and regular rhythm.      Pulses: Normal pulses.      Heart sounds: Normal heart sounds.   Pulmonary:      Effort: Pulmonary effort is normal.      Breath sounds: Normal breath sounds. No wheezing, rhonchi or rales.   Musculoskeletal:         General: Normal range of motion.      Cervical back: Normal range of motion and neck supple. No tenderness.   Lymphadenopathy:      Cervical: No cervical adenopathy.   Skin:     General: Skin is warm.      Capillary Refill: Capillary refill takes less than 2 seconds.   Neurological:      General: No focal deficit present.      Mental Status: She is alert and oriented to person, place, and time.            Diagnostics:    Results for orders placed or performed in visit on 01/15/25   POCT CoV-2, Flu A/B, RSV by PCR    Collection Time: 01/15/25  4:29 PM   Result Value Ref Range    SARS-CoV-2 by PCR Negative Negative, Invalid    Influenza virus A RNA Negative Negative, Invalid    Influenza virus B, PCR Negative Negative, Invalid    RSV, PCR Negative Negative, Invalid         Medical Decision making and plan :  I personally reviewed prior external notes and test results pertinent to today's visit. Pt is clinically stable  at today's acute urgent care visit.  Patient appears nontoxic with no acute distress noted. Appropriate for outpatient care at this time.    Pleasant 24 y.o. female presented clinic with:     Assessment & Plan  1. Viral URI.  She started experiencing symptoms yesterday, including a headache, body aches, and a fever of 101.7°F. She took two doses of 500 mg Tylenol to manage the fever. Viral testing is negative.  Advised patient symptoms are viral in etiology, recommend supportive care. Increased fluids and rest.  Recommend over-the-counter cold and flu medications and Tylenol and/or ibuprofen for symptomatic relief and fevers.  Discussed use of nedi-pot, humidifier, and Flonase nasal spray as well.  Discussed good hand hygiene and ways to decrease spread of disease.  Follow-up with PCP return for reevaluation if symptoms persist/worsen.  Patient offered discharge instructions regarding viral illness.  The patient demonstrated a good understanding and agreed with the treatment plan.         Shared decision-making was utilized with patient for treatment plan. Medication discussed included indication for use and the potential benefits and side effects. Education was provided regarding the aforementioned assessments.  Differential Diagnosis, natural history, and supportive care discussed. All of the patient's questions were answered to their satisfaction at the time of discharge. Patient was encouraged to monitor symptoms closely. Those signs and symptoms which would warrant concern and mandate seeking a higher level of service through the emergency department discussed at length.  Patient stated agreement and understanding of this plan of care.    Disposition:  Home in stable condition     Voice Recognition Disclaimer:  Portions of this document were created using voice recognition software and [employer] technology provided by [employer]. The software does have a chance of producing errors of grammar and possibly content. I  have made every reasonable attempt to correct obvious errors, but there may be errors of grammar and possibly content that I did not discover before finalizing the  documentation.    ALTAGRACIA Reed.